# Patient Record
Sex: FEMALE | Race: WHITE | NOT HISPANIC OR LATINO | Employment: UNEMPLOYED | URBAN - METROPOLITAN AREA
[De-identification: names, ages, dates, MRNs, and addresses within clinical notes are randomized per-mention and may not be internally consistent; named-entity substitution may affect disease eponyms.]

---

## 2024-05-10 ENCOUNTER — HOSPITAL ENCOUNTER (EMERGENCY)
Facility: HOSPITAL | Age: 64
Discharge: HOME | End: 2024-05-11
Attending: EMERGENCY MEDICINE
Payer: COMMERCIAL

## 2024-05-10 DIAGNOSIS — F10.929 ALCOHOL INTOXICATION (HCC): ICD-10-CM

## 2024-05-10 DIAGNOSIS — R45.851 SUICIDAL IDEATIONS: Primary | ICD-10-CM

## 2024-05-10 PROCEDURE — 99285 EMERGENCY DEPT VISIT HI MDM: CPT

## 2024-05-11 VITALS
DIASTOLIC BLOOD PRESSURE: 93 MMHG | RESPIRATION RATE: 16 BRPM | SYSTOLIC BLOOD PRESSURE: 155 MMHG | TEMPERATURE: 97.9 F | HEART RATE: 100 BPM | OXYGEN SATURATION: 99 %

## 2024-05-11 LAB
ALBUMIN SERPL BCP-MCNC: 4.1 G/DL (ref 3.5–5)
ALP SERPL-CCNC: 67 U/L (ref 34–104)
ALT SERPL W P-5'-P-CCNC: 10 U/L (ref 7–52)
AMPHETAMINES SERPL QL SCN: NEGATIVE
ANION GAP SERPL CALCULATED.3IONS-SCNC: 8 MMOL/L (ref 4–13)
ANISOCYTOSIS BLD QL SMEAR: PRESENT
AST SERPL W P-5'-P-CCNC: 18 U/L (ref 13–39)
BARBITURATES UR QL: NEGATIVE
BASOPHILS # BLD MANUAL: 0.1 THOUSAND/UL (ref 0–0.1)
BASOPHILS NFR MAR MANUAL: 1 % (ref 0–1)
BENZODIAZ UR QL: NEGATIVE
BILIRUB SERPL-MCNC: 0.42 MG/DL (ref 0.2–1)
BILIRUB UR QL STRIP: NEGATIVE
BUN SERPL-MCNC: 21 MG/DL (ref 5–25)
CALCIUM SERPL-MCNC: 9.7 MG/DL (ref 8.4–10.2)
CHLORIDE SERPL-SCNC: 110 MMOL/L (ref 96–108)
CLARITY UR: CLEAR
CO2 SERPL-SCNC: 21 MMOL/L (ref 21–32)
COCAINE UR QL: NEGATIVE
COLOR UR: COLORLESS
CREAT SERPL-MCNC: 1.33 MG/DL (ref 0.6–1.3)
DACRYOCYTES BLD QL SMEAR: PRESENT
EOSINOPHIL # BLD MANUAL: 0.1 THOUSAND/UL (ref 0–0.4)
EOSINOPHIL NFR BLD MANUAL: 1 % (ref 0–6)
ERYTHROCYTE [DISTWIDTH] IN BLOOD BY AUTOMATED COUNT: 15.4 % (ref 11.6–15.1)
ETHANOL EXG-MCNC: 0 MG/DL
ETHANOL EXG-MCNC: 0.17 MG/DL
FENTANYL UR QL SCN: NEGATIVE
GFR SERPL CREATININE-BSD FRML MDRD: 41 ML/MIN/1.73SQ M
GLUCOSE SERPL-MCNC: 93 MG/DL (ref 65–140)
GLUCOSE UR STRIP-MCNC: NEGATIVE MG/DL
HCT VFR BLD AUTO: 40.3 % (ref 34.8–46.1)
HGB BLD-MCNC: 13.6 G/DL (ref 11.5–15.4)
HGB UR QL STRIP.AUTO: NEGATIVE
HYDROCODONE UR QL SCN: NEGATIVE
KETONES UR STRIP-MCNC: NEGATIVE MG/DL
LEUKOCYTE ESTERASE UR QL STRIP: NEGATIVE
LYMPHOCYTES # BLD AUTO: 5.85 THOUSAND/UL (ref 0.6–4.47)
LYMPHOCYTES # BLD AUTO: 60 % (ref 14–44)
MCH RBC QN AUTO: 32.3 PG (ref 26.8–34.3)
MCHC RBC AUTO-ENTMCNC: 33.7 G/DL (ref 31.4–37.4)
MCV RBC AUTO: 96 FL (ref 82–98)
METHADONE UR QL: NEGATIVE
MONOCYTES # BLD AUTO: 0.29 THOUSAND/UL (ref 0–1.22)
MONOCYTES NFR BLD: 3 % (ref 4–12)
NEUTROPHILS # BLD MANUAL: 3.26 THOUSAND/UL (ref 1.85–7.62)
NEUTS SEG NFR BLD AUTO: 34 % (ref 43–75)
NITRITE UR QL STRIP: NEGATIVE
OPIATES UR QL SCN: NEGATIVE
OXYCODONE+OXYMORPHONE UR QL SCN: NEGATIVE
PCP UR QL: NEGATIVE
PH UR STRIP.AUTO: 5.5 [PH]
PLATELET # BLD AUTO: 355 THOUSANDS/UL (ref 149–390)
PLATELET BLD QL SMEAR: ADEQUATE
PMV BLD AUTO: 9.2 FL (ref 8.9–12.7)
POIKILOCYTOSIS BLD QL SMEAR: PRESENT
POTASSIUM SERPL-SCNC: 4.2 MMOL/L (ref 3.5–5.3)
PROT SERPL-MCNC: 7.8 G/DL (ref 6.4–8.4)
PROT UR STRIP-MCNC: NEGATIVE MG/DL
RBC # BLD AUTO: 4.21 MILLION/UL (ref 3.81–5.12)
RBC MORPH BLD: PRESENT
SODIUM SERPL-SCNC: 139 MMOL/L (ref 135–147)
SP GR UR STRIP.AUTO: 1 (ref 1–1.03)
THC UR QL: NEGATIVE
TSH SERPL DL<=0.05 MIU/L-ACNC: 2.16 UIU/ML (ref 0.45–4.5)
UROBILINOGEN UR STRIP-ACNC: <2 MG/DL
VARIANT LYMPHS # BLD AUTO: 1 %
WBC # BLD AUTO: 9.59 THOUSAND/UL (ref 4.31–10.16)

## 2024-05-11 PROCEDURE — 36415 COLL VENOUS BLD VENIPUNCTURE: CPT

## 2024-05-11 PROCEDURE — 85027 COMPLETE CBC AUTOMATED: CPT

## 2024-05-11 PROCEDURE — 80053 COMPREHEN METABOLIC PANEL: CPT

## 2024-05-11 PROCEDURE — 85007 BL SMEAR W/DIFF WBC COUNT: CPT

## 2024-05-11 PROCEDURE — 84443 ASSAY THYROID STIM HORMONE: CPT

## 2024-05-11 PROCEDURE — 82075 ASSAY OF BREATH ETHANOL: CPT

## 2024-05-11 PROCEDURE — 81003 URINALYSIS AUTO W/O SCOPE: CPT

## 2024-05-11 PROCEDURE — 96374 THER/PROPH/DIAG INJ IV PUSH: CPT

## 2024-05-11 PROCEDURE — 80307 DRUG TEST PRSMV CHEM ANLYZR: CPT

## 2024-05-11 PROCEDURE — 99285 EMERGENCY DEPT VISIT HI MDM: CPT | Performed by: EMERGENCY MEDICINE

## 2024-05-11 PROCEDURE — 93005 ELECTROCARDIOGRAM TRACING: CPT

## 2024-05-11 PROCEDURE — 96376 TX/PRO/DX INJ SAME DRUG ADON: CPT

## 2024-05-11 RX ORDER — LORAZEPAM 2 MG/ML
0.5 INJECTION INTRAMUSCULAR ONCE
Status: COMPLETED | OUTPATIENT
Start: 2024-05-11 | End: 2024-05-11

## 2024-05-11 RX ADMIN — LORAZEPAM 0.5 MG: 2 INJECTION INTRAMUSCULAR; INTRAVENOUS at 03:22

## 2024-05-11 RX ADMIN — LORAZEPAM 0.5 MG: 2 INJECTION INTRAMUSCULAR; INTRAVENOUS at 01:07

## 2024-05-11 NOTE — ED PROVIDER NOTES
History  Chief Complaint   Patient presents with    Psychiatric Evaluation     Pt reports being of of psychiatric medication for 3-4 months. Pt now reports SI with a plan.     Patient is a 64-year-old female with a significant past medical history of depression, bipolar disorder, borderline personality disorder, presenting for psychiatric evaluation.  Patient reports that lately she has been feeling some increased stress and depression and worsening suicidal ideations.  She has been having thoughts to shoot herself with a gun.  She does not have access to a gun. She called a suicide hotline today while intoxicated and was subsequently brought in.  She does also endorse some occasional homicidal ideations.  She denies any drug use.  She was previously on several medications for her psychiatric conditions however has not been taking them for some time now.        None       Past Medical History:   Diagnosis Date    Depression        No past surgical history on file.    No family history on file.  I have reviewed and agree with the history as documented.    E-Cigarette/Vaping     E-Cigarette/Vaping Substances           Review of Systems   Psychiatric/Behavioral:  Positive for suicidal ideas. Negative for hallucinations and self-injury.        Physical Exam  ED Triage Vitals [05/10/24 2351]   Temperature Pulse Respirations Blood Pressure SpO2   98.2 °F (36.8 °C) 89 18 155/76 98 %      Temp Source Heart Rate Source Patient Position - Orthostatic VS BP Location FiO2 (%)   Oral Monitor Lying Left arm --      Pain Score       No Pain             Orthostatic Vital Signs  Vitals:    05/10/24 2351 05/11/24 0622 05/11/24 0749   BP: 155/76  155/93   Pulse: 89 91 100   Patient Position - Orthostatic VS: Lying  Sitting       Physical Exam  Vitals and nursing note reviewed.   Constitutional:       General: She is not in acute distress.     Appearance: Normal appearance. She is not ill-appearing or toxic-appearing.      Comments:  Intoxicated appearing. Tearful.   HENT:      Head: Normocephalic and atraumatic.      Right Ear: External ear normal.      Left Ear: External ear normal.      Nose: Nose normal.   Eyes:      General: No scleral icterus.        Right eye: No discharge.         Left eye: No discharge.      Extraocular Movements: Extraocular movements intact.      Conjunctiva/sclera: Conjunctivae normal.   Cardiovascular:      Rate and Rhythm: Normal rate.      Heart sounds: Normal heart sounds. No murmur heard.     No friction rub. No gallop.   Pulmonary:      Effort: Pulmonary effort is normal. No respiratory distress.      Breath sounds: Normal breath sounds.   Abdominal:      General: Abdomen is flat. There is no distension.      Palpations: Abdomen is soft. There is no mass.      Tenderness: There is no abdominal tenderness.   Genitourinary:     Comments: Deferred  Skin:     General: Skin is warm and dry.   Neurological:      General: No focal deficit present.      Mental Status: She is alert.   Psychiatric:         Mood and Affect: Mood is anxious.         Thought Content: Thought content includes suicidal ideation.         ED Medications  Medications   LORazepam (ATIVAN) injection 0.5 mg (0.5 mg Intravenous Given 5/11/24 0107)   LORazepam (ATIVAN) injection 0.5 mg (0.5 mg Intravenous Given 5/11/24 0322)       Diagnostic Studies  Results Reviewed       Procedure Component Value Units Date/Time    POCT alcohol breath test [[id number]]  (Normal) Resulted: 05/11/24 1022    Lab Status: Final result Updated: 05/11/24 1022     EXTBreath Alcohol 0.000    RBC Morphology Reflex Test [[id number]] Collected: 05/11/24 0103    Lab Status: Final result Specimen: Blood from Arm, Right Updated: 05/11/24 0401    CBC and differential [[id number]]  (Abnormal) Collected: 05/11/24 0103    Lab Status: Final result Specimen: Blood from Arm, Right Updated: 05/11/24 0344     WBC 9.59 Thousand/uL      RBC 4.21 Million/uL      Hemoglobin 13.6 g/dL       Hematocrit 40.3 %      MCV 96 fL      MCH 32.3 pg      MCHC 33.7 g/dL      RDW 15.4 %      MPV 9.2 fL      Platelets 355 Thousands/uL     Narrative:      This is an appended report.  These results have been appended to a previously verified report.    Manual Differential(PHLEBS Do Not Order) [[id number]]  (Abnormal) Collected: 05/11/24 0103    Lab Status: Final result Specimen: Blood from Arm, Right Updated: 05/11/24 0344     Segmented % 34 %      Lymphocytes % 60 %      Monocytes % 3 %      Eosinophils % 1 %      Basophils % 1 %      Atypical Lymphocytes % 1 %      Absolute Neutrophils 3.26 Thousand/uL      Absolute Lymphocytes 5.85 Thousand/uL      Absolute Monocytes 0.29 Thousand/uL      Absolute Eosinophils 0.10 Thousand/uL      Absolute Basophils 0.10 Thousand/uL      Total Counted --     RBC Morphology Present     Platelet Estimate Adequate     Anisocytosis Present     Poikilocytes Present     Tear Drop Cells Present    TSH [[id number]]  (Normal) Collected: 05/11/24 0103    Lab Status: Final result Specimen: Blood from Arm, Right Updated: 05/11/24 0147     TSH 3RD GENERATON 2.156 uIU/mL     Rapid drug screen, urine [[id number]]  (Normal) Collected: 05/11/24 0103    Lab Status: Final result Specimen: Urine, Clean Catch Updated: 05/11/24 0138     Amph/Meth UR Negative     Barbiturate Ur Negative     Benzodiazepine Urine Negative     Cocaine Urine Negative     Methadone Urine Negative     Opiate Urine Negative     PCP Ur Negative     THC Urine Negative     Oxycodone Urine Negative     Fentanyl Urine Negative     HYDROCODONE URINE Negative    Narrative:      FOR MEDICAL PURPOSES ONLY.   IF CONFIRMATION NEEDED PLEASE CONTACT THE LAB WITHIN 5 DAYS.    Drug Screen Cutoff Levels:  AMPHETAMINE/METHAMPHETAMINES  1000 ng/mL  BARBITURATES     200 ng/mL  BENZODIAZEPINES     200 ng/mL  COCAINE      300 ng/mL  METHADONE      300 ng/mL  OPIATES      300 ng/mL  PHENCYCLIDINE     25 ng/mL  THC       50 ng/mL  OXYCODONE      100  ng/mL  FENTANYL      5 ng/mL  HYDROCODONE     300 ng/mL    Comprehensive metabolic panel [[id number]]  (Abnormal) Collected: 05/11/24 0103    Lab Status: Final result Specimen: Blood from Arm, Right Updated: 05/11/24 0132     Sodium 139 mmol/L      Potassium 4.2 mmol/L      Chloride 110 mmol/L      CO2 21 mmol/L      ANION GAP 8 mmol/L      BUN 21 mg/dL      Creatinine 1.33 mg/dL      Glucose 93 mg/dL      Calcium 9.7 mg/dL      AST 18 U/L      ALT 10 U/L      Alkaline Phosphatase 67 U/L      Total Protein 7.8 g/dL      Albumin 4.1 g/dL      Total Bilirubin 0.42 mg/dL      eGFR 41 ml/min/1.73sq m     Narrative:      National Kid[employer] Disease Foundation guidelines for Chronic Kidney Disease (CKD):     Stage 1 with normal or high GFR (GFR > 90 mL/min/1.73 square meters)    Stage 2 Mild CKD (GFR = 60-89 mL/min/1.73 square meters)    Stage 3A Moderate CKD (GFR = 45-59 mL/min/1.73 square meters)    Stage 3B Moderate CKD (GFR = 30-44 mL/min/1.73 square meters)    Stage 4 Severe CKD (GFR = 15-29 mL/min/1.73 square meters)    Stage 5 End Stage CKD (GFR <15 mL/min/1.73 square meters)  Note: GFR calculation is accurate only with a steady state creatinine    UA w Reflex to Microscopic w Reflex to Culture [[id number]] Collected: 05/11/24 0104    Lab Status: Final result Specimen: Urine, Clean Catch Updated: 05/11/24 0118     Color, UA Colorless     Clarity, UA Clear     Specific Gravity, UA 1.004     pH, UA 5.5     Leukocytes, UA Negative     Nitrite, UA Negative     Protein, UA Negative mg/dl      Glucose, UA Negative mg/dl      Ketones, UA Negative mg/dl      Urobilinogen, UA <2.0 mg/dl      Bilirubin, UA Negative     Occult Blood, UA Negative    POCT alcohol breath test [[id number]]  (Normal) Resulted: 05/11/24 0104    Lab Status: Final result Updated: 05/11/24 0104     EXTBreath Alcohol 0.165                   No orders to display         Procedures  Procedures      ED Course                             SBIRT 22yo+       Flowsheet Row Most Recent Value   Initial Alcohol Screen: US AUDIT-C     1. How often do you have a drink containing alcohol? 0 Filed at: 05/11/2024 0052   2. How many drinks containing alcohol do you have on a typical day you are drinking?  0 Filed at: 05/11/2024 0052   3b. FEMALE Any Age, or MALE 65+: How often do you have 4 or more drinks on one occassion? 0 Filed at: 05/11/2024 0052   Audit-C Score 0 Filed at: 05/11/2024 0052   JOVANA: How many times in the past year have you...    Used an illegal drug or used a prescription medication for non-medical reasons? Never Filed at: 05/11/2024 0052                  Medical Decision Making  Patient with history as above presented for a psychiatric evaluation. History obtained from patient.    Differential diagnosis includes: alcohol intoxication, suicidal ideation    Plan: estrellita psych workup, crisis eval, monitor for sobriety    Labs reviewed and remarkable for an elevated POC alcohol. Pending reevaluation when sober. Signed out to next shift.    Amount and/or Complexity of Data Reviewed  Labs: ordered.    Risk  Prescription drug management.          Disposition  Final diagnoses:   Suicidal ideations   Alcohol intoxication (HCC)     Time reflects when diagnosis was documented in both MDM as applicable and the Disposition within this note       Time User Action Codes Description Comment    5/11/2024 11:04 AM Dominique Almonte Add [R45.851] Suicidal ideations     5/11/2024 11:04 AM Dominique Almonte Add [F10.929] Alcohol intoxication (HCC)           ED Disposition       ED Disposition   Discharge    Condition   Stable    Date/Time   Sat May 11, 2024 1104    Comment   Cheryl Godinez discharge to home/self care.                   MD Documentation      Flowsheet Row Most Recent Value   Sending MD Dr. Epstein/Dr. Almonte          Follow-up Information       Follow up With Specialties Details Why Contact Info Additional Information    [facility]  Internal Medicine   [address] [facility] [address]  [facility] [id number] [phone] [facility], [address] [place] [address], [place], [place], [address] [phone]            There are no discharge medications for this patient.    No discharge procedures on file.    PDMP Review       None             ED Provider  Attending physically available and evaluated Cheryl Corbin Herbert. I managed the patient along with the ED Attending.    Electronically Signed by           Saeid Mayes DO  05/12/24 0501
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
## 2024-05-11 NOTE — DISCHARGE INSTRUCTIONS
You were seen in the Emergency Department today for psychological evaluation.    Please follow up with your primary care doctor in 1-2 days.  Please return to the Emergency Department if you experience worsening of your current symptoms, thoughts of hurting yourself or others, or any other concerning symptoms.

## 2024-05-11 NOTE — ED NOTES
Lunch tray delivered to patient. Returned belongings to patient.     Viridiana Hidalgo RN  05/11/24 0076

## 2024-05-13 LAB
ATRIAL RATE: 105 BPM
P AXIS: 80 DEGREES
PR INTERVAL: 122 MS
QRS AXIS: -8 DEGREES
QRSD INTERVAL: 62 MS
QT INTERVAL: 314 MS
QTC INTERVAL: 415 MS
T WAVE AXIS: 97 DEGREES
VENTRICULAR RATE: 105 BPM

## 2024-05-13 PROCEDURE — 93010 ELECTROCARDIOGRAM REPORT: CPT | Performed by: INTERNAL MEDICINE

## 2024-05-13 NOTE — ED ATTENDING ATTESTATION
5/10/2024  I, Susu Hayward MD, saw and evaluated the patient. I have discussed the patient with the resident/non-physician practitioner and agree with the resident's/non-physician practitioner's findings, Plan of Care, and MDM as documented in the resident's/non-physician practitioner's note, except where noted. All available labs and Radiology studies were reviewed.  I was present for key portions of any procedure(s) performed by the resident/non-physician practitioner and I was immediately available to provide assistance.       At this point I agree with the current assessment done in the Emergency Department.  I have conducted an independent evaluation of this patient a history and physical is as follows:    OA: 63 y/o f with h/o depression, bipolar d/o who presents with worsening depression and SI. Pt states that she feels hopeless, admits to drinking alcohol today and states she has thoughts of SI using a gun. PT does not currently have a gun per history. Currently denies thoughts of harm to others. Denies drug use. Denies compliance with psych meds, unclear if she is taking her BP medications. Denies cp/sob/headache/n/v/abd or back pain/urinary sxms/n/v/d or recent trauma. PE, cooperative, appears intoxicated, VSS, NC/AT, MMM, neck supple. FROM, RR, lungs CTAB, abd soft, +Bs, no obvious marks of current self harm, well healed scar on wrist, intact pulses, SI. A/p SI with plan. Geriatric psych. Will require re-evaluation when sober. Plan for 201 v 302.     ED Course     Pt signed out with crisis evaluation and 201 v 302 pending.     Critical Care Time  Procedures       1.64

## 2024-07-09 ENCOUNTER — HOSPITAL ENCOUNTER (EMERGENCY)
Facility: HOSPITAL | Age: 64
Discharge: HOME | End: 2024-07-09
Attending: EMERGENCY MEDICINE
Payer: COMMERCIAL

## 2024-07-09 ENCOUNTER — APPOINTMENT (EMERGENCY)
Dept: RADIOLOGY | Facility: HOSPITAL | Age: 64
End: 2024-07-09
Payer: COMMERCIAL

## 2024-07-09 VITALS
RESPIRATION RATE: 20 BRPM | OXYGEN SATURATION: 97 % | TEMPERATURE: 98 F | SYSTOLIC BLOOD PRESSURE: 95 MMHG | DIASTOLIC BLOOD PRESSURE: 58 MMHG | HEART RATE: 84 BPM

## 2024-07-09 DIAGNOSIS — S99.929A FOOT INJURY: Primary | ICD-10-CM

## 2024-07-09 PROCEDURE — 99283 EMERGENCY DEPT VISIT LOW MDM: CPT

## 2024-07-09 PROCEDURE — 73630 X-RAY EXAM OF FOOT: CPT

## 2024-07-09 PROCEDURE — 10140 I&D HMTMA SEROMA/FLUID COLLJ: CPT | Performed by: EMERGENCY MEDICINE

## 2024-07-09 PROCEDURE — 90471 IMMUNIZATION ADMIN: CPT

## 2024-07-09 PROCEDURE — 90715 TDAP VACCINE 7 YRS/> IM: CPT

## 2024-07-09 PROCEDURE — 99284 EMERGENCY DEPT VISIT MOD MDM: CPT | Performed by: EMERGENCY MEDICINE

## 2024-07-09 RX ORDER — CEFADROXIL 500 MG/1
500 CAPSULE ORAL EVERY 12 HOURS SCHEDULED
Qty: 14 CAPSULE | Refills: 0 | Status: SHIPPED | OUTPATIENT
Start: 2024-07-09 | End: 2024-07-16

## 2024-07-09 RX ORDER — LIDOCAINE HYDROCHLORIDE 10 MG/ML
10 INJECTION, SOLUTION EPIDURAL; INFILTRATION; INTRACAUDAL; PERINEURAL ONCE
Status: COMPLETED | OUTPATIENT
Start: 2024-07-09 | End: 2024-07-09

## 2024-07-09 RX ORDER — ACETAMINOPHEN 325 MG/1
650 TABLET ORAL ONCE
Status: COMPLETED | OUTPATIENT
Start: 2024-07-09 | End: 2024-07-09

## 2024-07-09 RX ORDER — CEFADROXIL 500 MG/1
1000 CAPSULE ORAL ONCE
Status: COMPLETED | OUTPATIENT
Start: 2024-07-09 | End: 2024-07-09

## 2024-07-09 RX ADMIN — TETANUS TOXOID, REDUCED DIPHTHERIA TOXOID AND ACELLULAR PERTUSSIS VACCINE, ADSORBED 0.5 ML: 5; 2.5; 8; 8; 2.5 SUSPENSION INTRAMUSCULAR at 19:04

## 2024-07-09 RX ADMIN — CEFADROXIL 1000 MG: 500 CAPSULE ORAL at 20:51

## 2024-07-09 RX ADMIN — LIDOCAINE HYDROCHLORIDE 10 ML: 10 INJECTION, SOLUTION EPIDURAL; INFILTRATION; INTRACAUDAL; PERINEURAL at 18:59

## 2024-07-09 RX ADMIN — ACETAMINOPHEN 650 MG: 325 TABLET, FILM COATED ORAL at 19:04

## 2024-07-09 NOTE — ED PROVIDER NOTES
History  Chief Complaint   Patient presents with    Foot Swelling     Pt was pulling out a dresser drawer 3 days ago and has L foot swelling, redness, and bruising     HPI    Patient is a 64 year old female w/ hx of HTN who presents with left foot pain after she fell, pulling a dresser drawer onto her foot 3 days ago. She was able to ambulate. The pain has worsened. She has recently been started on her carvediol again and has an increase in falls. She has an appointment this week with her PCP to adjust her dose.  She denies headache, weakness, numbness.         None       Past Medical History:   Diagnosis Date    Depression        History reviewed. No pertinent surgical history.    History reviewed. No pertinent family history.  I have reviewed and agree with the history as documented.    E-Cigarette/Vaping     E-Cigarette/Vaping Substances           Review of Systems   Constitutional:  Negative for chills and fever.   HENT:  Negative for congestion and sore throat.    Respiratory:  Negative for cough and shortness of breath.    Cardiovascular:  Negative for chest pain and palpitations.   Gastrointestinal:  Negative for abdominal pain and vomiting.   Genitourinary:  Negative for dysuria and hematuria.   Musculoskeletal:  Negative for back pain and neck pain.   Neurological:  Negative for syncope and headaches.   All other systems reviewed and are negative.      Physical Exam  ED Triage Vitals [07/09/24 1628]   Temperature Pulse Respirations Blood Pressure SpO2   98 °F (36.7 °C) 84 20 95/58 97 %      Temp Source Heart Rate Source Patient Position - Orthostatic VS BP Location FiO2 (%)   Oral Monitor -- Right arm --      Pain Score       5             Orthostatic Vital Signs  Vitals:    07/09/24 1628   BP: 95/58   Pulse: 84       Physical Exam  Vitals and nursing note reviewed.   Constitutional:       General: She is not in acute distress.     Appearance: She is well-developed.   HENT:      Head: Normocephalic and  atraumatic.   Eyes:      Conjunctiva/sclera: Conjunctivae normal.   Cardiovascular:      Rate and Rhythm: Normal rate and regular rhythm.      Heart sounds: No murmur heard.  Pulmonary:      Effort: Pulmonary effort is normal. No respiratory distress.      Breath sounds: Normal breath sounds.   Abdominal:      Palpations: Abdomen is soft.      Tenderness: There is no abdominal tenderness.   Musculoskeletal:      Cervical back: Neck supple.      Comments: Tender to palpation, edematous. Left foot with large area of ecchymosis with a a small wound over lateral foot edge. No active bleeding.    Skin:     General: Skin is warm and dry.      Capillary Refill: Capillary refill takes less than 2 seconds.   Neurological:      Mental Status: She is alert.      Comments: Decreased sensation left foot.   Psychiatric:         Mood and Affect: Mood normal.         ED Medications  Medications   acetaminophen (TYLENOL) tablet 650 mg (650 mg Oral Given 7/9/24 1904)   tetanus-diphtheria-acellular pertussis (BOOSTRIX) IM injection 0.5 mL (0.5 mL Intramuscular Given 7/9/24 1904)   lidocaine (PF) (XYLOCAINE-MPF) 1 % injection 10 mL (10 mL Infiltration Given 7/9/24 1859)   cefadroxil (DURICEF) capsule 1,000 mg (1,000 mg Oral Given 7/9/24 2051)       Diagnostic Studies  Results Reviewed       None                   XR foot 3+ views LEFT   Final Result by Ga Geronimo MD (07/10 1017)      No acute osseous abnormality.         Computerized Assisted Algorithm (CAA) may have been used to analyze all applicable images.         Workstation performed: [id number]               Procedures  Incision and drain    Date/Time: 7/9/2024 6:00 PM    Performed by: Dominique Almonte MD  Authorized by: Dominique Almonte MD  Universal Protocol:  Consent: Verbal consent obtained.  Risks and benefits: risks, benefits and alternatives were discussed  Consent given by: patient  Patient understanding: patient states understanding of the procedure being  performed  Patient identity confirmed: hospital-assigned identification number    Patient location:  ED  Location:     Type:  Fluid collection    Location:  Lower extremity    Lower extremity location:  L foot  Pre-procedure details:     Skin preparation:  Chloraprep  Anesthesia (see MAR for exact dosages):     Anesthesia method:  Local infiltration    Local anesthetic:  Lidocaine 1% w/o epi  Procedure details:     Complexity:  Simple    Incision types:  Stab incision    Scalpel blade:  11    Approach:  Open    Incision depth:  Subcutaneous    Wound management:  Irrigated with saline    Drainage:  Bloody    Drainage amount:  Moderate    Wound treatment:  Wound left open  Post-procedure details:     Patient tolerance of procedure:  Tolerated well, no immediate complications        ED Course                                       Medical Decision Making  Differential includes fracture, dislocation, soft tissue injury.  Will order x-ray and reevaluate.    X-rays are negative.  Will I&D to allow fluid to drain.    A moderate amount of fluid was drained from patient's foot. She was able to ambulate on her own. An ambulatory referral for podiatry was provided with her.  He was told to follow-up with them.  She was given return precautions and discharged from the ED     Amount and/or Complexity of Data Reviewed  Radiology: ordered.    Risk  OTC drugs.  Prescription drug management.          Disposition  Final diagnoses:   Foot injury     Time reflects when diagnosis was documented in both MDM as applicable and the Disposition within this note       Time User Action Codes Description Comment    7/9/2024  8:21 PM Dominique Almonte Add [S99.929A] Foot injury           ED Disposition       ED Disposition   Discharge    Condition   Stable    Date/Time   Tue Jul 9, 2024  8:21 PM    Comment   Cheryl Godinez discharge to home/self care.                   Follow-up Information       Follow up With Specialties Details Why Contact  Info Additional Information    [facility] [place] [facility]   [address] [facility] [address] [id number] [phone] [facility] [place], [address], [place], [id number] [phone]            Discharge Medication List as of 7/9/2024  8:25 PM        START taking these medications    Details   cefadroxil (DURICEF) 500 mg capsule Take 1 capsule (500 mg total) by mouth every 12 (twelve) hours for 7 days, Starting Tue 7/9/2024, Until Tue 7/16/2024, Normal               PDMP Review       None             ED Provider  Attending physically available and evaluated Cheryl Godinez. I managed the patient along with the ED Attending.    Electronically Signed by           Dominique Almonte MD  07/12/24 9876

## 2024-07-09 NOTE — ED ATTENDING ATTESTATION
7/9/2024  I, Ced Renteria DO, saw and evaluated the patient. I have discussed the patient with the resident/non-physician practitioner and agree with the resident's/non-physician practitioner's findings, Plan of Care, and MDM as documented in the resident's/non-physician practitioner's note, except where noted. All available labs and Radiology studies were reviewed.  I was present for key portions of any procedure(s) performed by the resident/non-physician practitioner and I was immediately available to provide assistance.       At this point I agree with the current assessment done in the Emergency Department.  I have conducted an independent evaluation of this patient a history and physical is as follows:    65 yo woman presents for L foot pain and swelling after a dresser drawer fell out onto her foot. Able to ambulate with some pain. Has large area of bruising. No focal weakness, fever. Pt on carvedilol - had been on it remotely then stopped taking it. Started taking it again recently and has increased frequency of falls. PMD cut carvedilol in half.     BP here 95/58.     Imp: L foot injury. Likely contusion, consider fx, infx plan: L foot xray, reassess.      ED Course         Critical Care Time  Procedures

## 2024-07-10 NOTE — DISCHARGE INSTRUCTIONS
You were seen in the Emergency Department today for a foot injury.    Please follow up with Podiatry.  Please return to the Emergency Department if you experience worsening of your current symptoms, severe pain, increased redness or swelling, or any other concerning symptoms.

## 2024-07-24 ENCOUNTER — HOSPITAL ENCOUNTER (INPATIENT)
Facility: HOSPITAL | Age: 64
LOS: 2 days | Discharge: HOME | DRG: 605 | End: 2024-07-26
Attending: EMERGENCY MEDICINE | Admitting: PODIATRIST
Payer: COMMERCIAL

## 2024-07-24 ENCOUNTER — APPOINTMENT (EMERGENCY)
Dept: RADIOLOGY | Facility: HOSPITAL | Age: 64
DRG: 605 | End: 2024-07-24
Payer: COMMERCIAL

## 2024-07-24 ENCOUNTER — APPOINTMENT (INPATIENT)
Dept: NON INVASIVE DIAGNOSTICS | Facility: HOSPITAL | Age: 64
DRG: 605 | End: 2024-07-24
Payer: COMMERCIAL

## 2024-07-24 DIAGNOSIS — T14.8XXA INFECTED WOUND: Primary | ICD-10-CM

## 2024-07-24 DIAGNOSIS — S90.32XA HEMATOMA OF LEFT FOOT: ICD-10-CM

## 2024-07-24 DIAGNOSIS — S99.922D FOOT INJURY, LEFT, SUBSEQUENT ENCOUNTER: ICD-10-CM

## 2024-07-24 DIAGNOSIS — L08.9 INFECTED WOUND: Primary | ICD-10-CM

## 2024-07-24 PROBLEM — S89.90XA LEG INJURY: Status: ACTIVE | Noted: 2024-07-24

## 2024-07-24 PROBLEM — L03.116 CELLULITIS OF LEFT FOOT: Status: ACTIVE | Noted: 2024-07-24

## 2024-07-24 PROBLEM — I10 HYPERTENSION: Status: ACTIVE | Noted: 2024-07-24

## 2024-07-24 PROBLEM — M79.672 LEFT FOOT PAIN: Status: ACTIVE | Noted: 2024-07-24

## 2024-07-24 PROBLEM — I21.9 HEART ATTACK (HCC): Status: ACTIVE | Noted: 2024-07-24

## 2024-07-24 LAB
ANION GAP SERPL CALCULATED.3IONS-SCNC: 10 MMOL/L (ref 4–13)
BASOPHILS # BLD AUTO: 0.08 THOUSANDS/ÂΜL (ref 0–0.1)
BASOPHILS NFR BLD AUTO: 1 % (ref 0–1)
BUN SERPL-MCNC: 26 MG/DL (ref 5–25)
CALCIUM SERPL-MCNC: 9.2 MG/DL (ref 8.4–10.2)
CHLORIDE SERPL-SCNC: 105 MMOL/L (ref 96–108)
CO2 SERPL-SCNC: 25 MMOL/L (ref 21–32)
CREAT SERPL-MCNC: 1.62 MG/DL (ref 0.6–1.3)
CRP SERPL QL: 11.2 MG/L
EOSINOPHIL # BLD AUTO: 0.05 THOUSAND/ÂΜL (ref 0–0.61)
EOSINOPHIL NFR BLD AUTO: 1 % (ref 0–6)
ERYTHROCYTE [DISTWIDTH] IN BLOOD BY AUTOMATED COUNT: 15.5 % (ref 11.6–15.1)
ERYTHROCYTE [SEDIMENTATION RATE] IN BLOOD: 44 MM/HOUR (ref 0–29)
GFR SERPL CREATININE-BSD FRML MDRD: 33 ML/MIN/1.73SQ M
GLUCOSE SERPL-MCNC: 142 MG/DL (ref 65–140)
HCT VFR BLD AUTO: 37.2 % (ref 34.8–46.1)
HGB BLD-MCNC: 12.4 G/DL (ref 11.5–15.4)
IMM GRANULOCYTES # BLD AUTO: 0.02 THOUSAND/UL (ref 0–0.2)
IMM GRANULOCYTES NFR BLD AUTO: 0 % (ref 0–2)
LYMPHOCYTES # BLD AUTO: 2.91 THOUSANDS/ÂΜL (ref 0.6–4.47)
LYMPHOCYTES NFR BLD AUTO: 37 % (ref 14–44)
MCH RBC QN AUTO: 31.7 PG (ref 26.8–34.3)
MCHC RBC AUTO-ENTMCNC: 33.3 G/DL (ref 31.4–37.4)
MCV RBC AUTO: 95 FL (ref 82–98)
MONOCYTES # BLD AUTO: 0.61 THOUSAND/ÂΜL (ref 0.17–1.22)
MONOCYTES NFR BLD AUTO: 8 % (ref 4–12)
NEUTROPHILS # BLD AUTO: 4.21 THOUSANDS/ÂΜL (ref 1.85–7.62)
NEUTS SEG NFR BLD AUTO: 53 % (ref 43–75)
NRBC BLD AUTO-RTO: 0 /100 WBCS
PLATELET # BLD AUTO: 340 THOUSANDS/UL (ref 149–390)
PMV BLD AUTO: 8.8 FL (ref 8.9–12.7)
POTASSIUM SERPL-SCNC: 3.8 MMOL/L (ref 3.5–5.3)
RBC # BLD AUTO: 3.91 MILLION/UL (ref 3.81–5.12)
SODIUM SERPL-SCNC: 140 MMOL/L (ref 135–147)
WBC # BLD AUTO: 7.88 THOUSAND/UL (ref 4.31–10.16)

## 2024-07-24 PROCEDURE — 96361 HYDRATE IV INFUSION ADD-ON: CPT

## 2024-07-24 PROCEDURE — 99223 1ST HOSP IP/OBS HIGH 75: CPT | Performed by: PODIATRIST

## 2024-07-24 PROCEDURE — 99284 EMERGENCY DEPT VISIT MOD MDM: CPT

## 2024-07-24 PROCEDURE — 73701 CT LOWER EXTREMITY W/DYE: CPT

## 2024-07-24 PROCEDURE — 96374 THER/PROPH/DIAG INJ IV PUSH: CPT

## 2024-07-24 PROCEDURE — 86140 C-REACTIVE PROTEIN: CPT

## 2024-07-24 PROCEDURE — 80048 BASIC METABOLIC PNL TOTAL CA: CPT

## 2024-07-24 PROCEDURE — 93971 EXTREMITY STUDY: CPT | Performed by: SURGERY

## 2024-07-24 PROCEDURE — 85025 COMPLETE CBC W/AUTO DIFF WBC: CPT

## 2024-07-24 PROCEDURE — 99285 EMERGENCY DEPT VISIT HI MDM: CPT | Performed by: EMERGENCY MEDICINE

## 2024-07-24 PROCEDURE — 93971 EXTREMITY STUDY: CPT

## 2024-07-24 PROCEDURE — 1124F ACP DISCUSS-NO DSCNMKR DOCD: CPT | Performed by: EMERGENCY MEDICINE

## 2024-07-24 PROCEDURE — 36415 COLL VENOUS BLD VENIPUNCTURE: CPT

## 2024-07-24 PROCEDURE — 85652 RBC SED RATE AUTOMATED: CPT

## 2024-07-24 RX ORDER — HYDROCODONE BITARTRATE AND ACETAMINOPHEN 5; 325 MG/1; MG/1
1 TABLET ORAL EVERY 6 HOURS PRN
Status: DISCONTINUED | OUTPATIENT
Start: 2024-07-24 | End: 2024-07-26 | Stop reason: HOSPADM

## 2024-07-24 RX ORDER — ACETAMINOPHEN 325 MG/1
650 TABLET ORAL EVERY 6 HOURS PRN
Status: DISCONTINUED | OUTPATIENT
Start: 2024-07-24 | End: 2024-07-26 | Stop reason: HOSPADM

## 2024-07-24 RX ORDER — HYDROMORPHONE HCL/PF 1 MG/ML
0.5 SYRINGE (ML) INJECTION ONCE
Status: COMPLETED | OUTPATIENT
Start: 2024-07-24 | End: 2024-07-24

## 2024-07-24 RX ORDER — HYDROMORPHONE HCL IN WATER/PF 6 MG/30 ML
0.2 PATIENT CONTROLLED ANALGESIA SYRINGE INTRAVENOUS EVERY 6 HOURS PRN
Status: DISCONTINUED | OUTPATIENT
Start: 2024-07-24 | End: 2024-07-26 | Stop reason: HOSPADM

## 2024-07-24 RX ORDER — CEFAZOLIN SODIUM 2 G/50ML
2000 SOLUTION INTRAVENOUS EVERY 8 HOURS
Status: DISCONTINUED | OUTPATIENT
Start: 2024-07-24 | End: 2024-07-26 | Stop reason: HOSPADM

## 2024-07-24 RX ORDER — CALCIUM CARBONATE 500 MG/1
1000 TABLET, CHEWABLE ORAL DAILY PRN
Status: DISCONTINUED | OUTPATIENT
Start: 2024-07-24 | End: 2024-07-26 | Stop reason: HOSPADM

## 2024-07-24 RX ORDER — ONDANSETRON 2 MG/ML
4 INJECTION INTRAMUSCULAR; INTRAVENOUS EVERY 6 HOURS PRN
Status: DISCONTINUED | OUTPATIENT
Start: 2024-07-24 | End: 2024-07-26 | Stop reason: HOSPADM

## 2024-07-24 RX ORDER — DOCUSATE SODIUM 100 MG/1
100 CAPSULE, LIQUID FILLED ORAL 2 TIMES DAILY
Status: DISCONTINUED | OUTPATIENT
Start: 2024-07-24 | End: 2024-07-26 | Stop reason: HOSPADM

## 2024-07-24 RX ORDER — OXYCODONE HYDROCHLORIDE AND ACETAMINOPHEN 5; 325 MG/1; MG/1
1 TABLET ORAL EVERY 6 HOURS PRN
Status: DISCONTINUED | OUTPATIENT
Start: 2024-07-24 | End: 2024-07-26 | Stop reason: HOSPADM

## 2024-07-24 RX ADMIN — IOHEXOL 100 ML: 350 INJECTION, SOLUTION INTRAVENOUS at 15:01

## 2024-07-24 RX ADMIN — SODIUM CHLORIDE 1000 ML: 0.9 INJECTION, SOLUTION INTRAVENOUS at 14:45

## 2024-07-24 RX ADMIN — HYDROMORPHONE HYDROCHLORIDE 0.5 MG: 1 INJECTION, SOLUTION INTRAMUSCULAR; INTRAVENOUS; SUBCUTANEOUS at 18:17

## 2024-07-24 RX ADMIN — OXYCODONE HYDROCHLORIDE AND ACETAMINOPHEN 1 TABLET: 5; 325 TABLET ORAL at 21:23

## 2024-07-24 RX ADMIN — HYDROMORPHONE HYDROCHLORIDE 0.5 MG: 1 INJECTION, SOLUTION INTRAMUSCULAR; INTRAVENOUS; SUBCUTANEOUS at 14:45

## 2024-07-24 RX ADMIN — CEFAZOLIN SODIUM 2000 MG: 2 SOLUTION INTRAVENOUS at 18:50

## 2024-07-24 NOTE — ED NOTES
Patient in waiting room with no outward signs of distress. Respiratory effort is even and unlabored      Carmen Low RN  07/24/24 1450

## 2024-07-24 NOTE — CONSULTS
Podiatry - Consultation    Patient Information:   Cheryl Godinez 64 y.o. female MRN: [id number]  Unit/Bed#: ED 21 Encounter: [id number]  PCP: No primary care provider on file.  Date of Admission:  7/24/2024  Date of Consultation: 07/24/24  Requesting Physician: Viridiana Storey,       ASSESSMENT:    Cheryl Godinez is a 64 y.o. female with:    Post-traumatic hematoma of the left foot  Cellulitis  Left foot pain  Hypertension    PLAN:    Patient will be admitted under Podiatry service for cellulitic left foot post-traumatic hematoma under Dr. Bills.   Patient is neurovascularly intact. Mild cellulitis noted to the left foot. Severe pain handled relatively well by Dilaudid. Pain prescriptions in for mild, moderate, severe, and breakthrough pain as needed.  Plan to admit to podiatry service for monitoring with IV abx for cellulitis and rehabilitation as patient relays that at home she is unable to stay off of her foot.  CT reviewed, swelling to left forefoot, no gas emphysema noted. No fractures  Labs and vitals reviewed, patient is afebrile and aleukocytic with an elevated sed rate and CRP  Start on Ancef 2gq8  Daily draw labs ordered (CBC and BMP)  RICE protocol with ice behind left knee  F/u Venous duplex to r/o DVT to left leg  WBAT to left heel in surgical shoe  Appreciate SLIM consult as while patient states she has limited prior medical history, on repeated questioning she will   F/u CM consult. Patient relays she is unable to stay off of her foot as she is her own primary caretaker and has no assistance at home.  PT/OT consulted for change in weightbearing status will follow-up suggestions.  Local wound care consisting of Betadine DSD with ACE compression. Patient insisted that while she is allergic to shellfish, she has had betadine topically before and no issues with it.  Elevation on green foam wedges or pillows when non-ambulatory  Will discuss this plan with my attending and update as  needed.    Weightbearing status: WBAT to left heel in surgical shoe    SUBJECTIVE:    History of Present Illness:    Cheryl Godinez is a 64 y.o. female who is originally admitted 7/24/2024 due to pain in the left foot secondary to trauma to the foot. Patient has a past medical history of Hypertension, smoking history 25 years ago, rheumatoid arthritis, and past heart attack. Patient reports that she dropped a drawer on her left foot from a height of 4 feet. She previously came to the ED on 07/09/24 for this injury and an I&D was done in the emergency department. Xrays were negative for fractures and fluid was drained from her foot before being discharged. She mentions that the fluid from the I&D was clear and bloody. She was at her primary doctor's office today who advised her to come to the ED to get her foot checked again. Her pain level is 10/10. She has tried Motrin for pain. Patient also mentions that the swelling and ecchymosis has been spreading in her lower extremity as she has been unable to stay off of her foot. She walked into the ED today. Patient relays that she has no support at home and she currently live in a room of a house a couple rents out to her. She is unable to stay off of her foot as she is her own primary caretaker. No fevers, chills, nausea, SOB currently or recently. She mentions left foot pins and needles.    We are consulted for post-traumatic left foot pain with hematoma formation    Review of Systems:    Constitutional: Negative.    HENT: Negative.    Eyes: Negative.    Respiratory: Negative.    Cardiovascular: Negative.    Gastrointestinal: Negative.    Musculoskeletal: Left foot pain   Skin:Left foot hematoma, cellulitis  Neurological: Pins of needles of left foot  Psych: Negative.     Past Medical and Surgical History:     Past Medical History:   Diagnosis Date    Depression        No past surgical history on file.    Meds/Allergies:    Not in a hospital  admission.    Allergies   Allergen Reactions    Methotrexate GI Intolerance    Sacubitril-Valsartan GI Intolerance    Iodine - Food Allergy Rash       Social History:     Marital Status: Single    Substance Use History:   Social History     Substance and Sexual Activity   Alcohol Use None     Social History     Tobacco Use   Smoking Status Not on file   Smokeless Tobacco Not on file     Social History     Substance and Sexual Activity   Drug Use Not on file       Family History:    No family history on file.      OBJECTIVE:    Vitals:   Blood Pressure: 167/75 (07/24/24 1549)  Pulse: 75 (07/24/24 1549)  Temperature: 98.1 °F (36.7 °C) (07/24/24 1120)  Temp Source: Oral (07/24/24 1120)  Respirations: 16 (07/24/24 1549)  SpO2: 100 % (07/24/24 1549)    Physical Exam:    General Appearance: Alert, cooperative, no distress.  HEENT: Head normocephalic, atraumatic, without obvious abnormality.  Heart: Normal rate and rhythm.  Lungs: Non-labored breathing. No respiratory distress.  Abdomen: Without distension.  Psychiatric: AAOx3  Lower Extremity:  Vascular:   Right DP and PT pulses are present. Left DP and PT pulses are present. CRT < 3 seconds at the digits. +3/4 edema noted at left lower extremities. Pedal hair is present. Skin temperature is warm bilaterally.    Musculoskeletal:  Pain on palpation to the left foot at the level of the metatarsals on the dorsal forefoot  Reduced ROM of left toes and ankle  Patient able to move toes of left foot    5/5 MMT to right foot, Ankle ROM WNL  No pain on palpation    Dermatological:  Lower extremity wound(s) as noted below:    Closed Hematoma noted to left forefoot dorsally in the 4th/5th interspace with pain on palpation and mild cellulitis  Entire left forefoot and midfoot are edematous and ecchymotic  Middle of hematoma is an eschar, no bogginess noted. No odor, crepitus, fluctuance.      Neurological:  Gross sensation is intact. Protective sensation is intact.    Clinical Images  "07/24/24:      Additional data:     Lab Results: I have personally reviewed pertinent labs including:    Results from last 7 days   Lab Units 07/24/24  1355   WBC Thousand/uL 7.88   HEMOGLOBIN g/dL 12.4   HEMATOCRIT % 37.2   PLATELETS Thousands/uL 340   SEGS PCT % 53   LYMPHO PCT % 37   MONO PCT % 8   EOS PCT % 1     Results from last 7 days   Lab Units 07/24/24  1355   POTASSIUM mmol/L 3.8   CHLORIDE mmol/L 105   CO2 mmol/L 25   BUN mg/dL 26*   CREATININE mg/dL 1.62*   CALCIUM mg/dL 9.2           Cultures: I have personally reviewed pertinent cultures including:                  ** Please Note: Portions of the record may have been created with voice recognition software. Occasional wrong word or \"sound a like\" substitutions may have occurred due to the inherent limitations of voice recognition software. Read the chart carefully and recognize, using context, where substitutions have occurred. **    "

## 2024-07-24 NOTE — ED NOTES
Ice applied to LLE and extremity elevated on pillow at this time     Rashmi Jane, JOANN  07/24/24 3836

## 2024-07-24 NOTE — ED ATTENDING ATTESTATION
7/24/2024  I, Viridiana Storey DO, saw and evaluated the patient. I have discussed the patient with the resident/non-physician practitioner and agree with the resident's/non-physician practitioner's findings, Plan of Care, and MDM as documented in the resident's/non-physician practitioner's note, except where noted. All available labs and Radiology studies were reviewed.  I was present for key portions of any procedure(s) performed by the resident/non-physician practitioner and I was immediately available to provide assistance.       At this point I agree with the current assessment done in the Emergency Department.  I have conducted an independent evaluation of this patient a history and physical is as follows:    Chief Complaint   Patient presents with    Leg Injury     Patient was seen on the 19th for a left foot injury. She arrives today because her PCP told her the foot is now infected. Foot is red and bruised. Would is bleeding, skin is peeling.,      64-year-old female presents with left foot pain.  Patient states she dropped a drawer on it on July 9 and was seen on the ninth for infection that was drained at that time.  Patient went to see family doctor today and was sent to the emergency department secondary to increased swelling, redness and pain.  Patient states she did take antibiotics after last visit.  Reports that the redness has gotten worse over the past couple days.  On exam-no acute distress, heart regular, no respiratory distress, left foot with erythema and warm to touch, small area of peeling skin, ecchymosis noted on the dorsum of the foot and on toes, necrotic area at base of fifth toe.  Edema of foot.  Plan-concern for infection versus other pathology in the left foot, will do CT, check labs.  Likely consult podiatry and reassess    ED Course         Critical Care Time  Procedures

## 2024-07-24 NOTE — ED PROVIDER NOTES
History  Chief Complaint   Patient presents with    Leg Injury     Patient was seen on the 19th for a left foot injury. She arrives today because her PCP told her the foot is now infected. Foot is red and bruised. Would is bleeding, skin is peeling.,      HPI    Patient is a 64-year-old female with with a past medical history of left dorsal foot wound secondary to trauma presenting today for increasing redness, swelling, skin peeling, and pain in her left foot.  On July 9, she passed out and dropped a drawer on her foot causing a wound.  She was seen on this day, received an incision and drainage, and sent home with antibiotics.  Patient states she finished the course of antibiotics.  She states has an appointment to see podiatry on the 29th.  She has been using spray and peroxide on her foot with no improvement.  She denies fevers, chills, nausea, vomiting, constipation, diarrhea, and pain other than her left foot.    None       Past Medical History:   Diagnosis Date    Depression        No past surgical history on file.    No family history on file.  I have reviewed and agree with the history as documented.    E-Cigarette/Vaping     E-Cigarette/Vaping Substances           Review of Systems   Skin:  Positive for color change and wound.        Increasing pain, redness, skin peeling, and pain to left foot wound.   All other systems reviewed and are negative.      Physical Exam  ED Triage Vitals   Temperature Pulse Respirations Blood Pressure SpO2   07/24/24 1120 07/24/24 1120 07/24/24 1120 07/24/24 1120 07/24/24 1120   98.1 °F (36.7 °C) 88 16 (!) 183/94 100 %      Temp Source Heart Rate Source Patient Position - Orthostatic VS BP Location FiO2 (%)   07/24/24 1120 07/24/24 1120 07/24/24 1120 07/24/24 1120 --   Oral Monitor Sitting Left arm       Pain Score       07/24/24 1445       10 - Worst Possible Pain             Orthostatic Vital Signs  Vitals:    07/24/24 1120 07/24/24 1549 07/24/24 1700   BP: (!) 183/94 167/75  169/82   Pulse: 88 75 80   Patient Position - Orthostatic VS: Sitting         Physical Exam  Vitals and nursing note reviewed.   Constitutional:       General: She is not in acute distress.     Appearance: Normal appearance. She is not ill-appearing, toxic-appearing or diaphoretic.   HENT:      Head: Normocephalic and atraumatic.   Eyes:      General: No scleral icterus.        Right eye: No discharge.         Left eye: No discharge.   Cardiovascular:      Rate and Rhythm: Normal rate and regular rhythm.      Pulses: Normal pulses.           Dorsalis pedis pulses are 2+ on the right side and 2+ on the left side.      Heart sounds: Normal heart sounds. No murmur heard.     No friction rub. No gallop.   Pulmonary:      Effort: Pulmonary effort is normal. No respiratory distress.      Breath sounds: Normal breath sounds. No stridor. No wheezing, rhonchi or rales.   Abdominal:      General: Bowel sounds are normal. There is no distension.      Palpations: Abdomen is soft.      Tenderness: There is no abdominal tenderness. There is no right CVA tenderness, left CVA tenderness, guarding or rebound.   Musculoskeletal:         General: Normal range of motion.      Right lower leg: Edema present.      Left lower leg: Edema present.   Skin:     General: Skin is warm and dry.      Coloration: Skin is not jaundiced.      Findings: Wound (On dorsum of left foot.  Collection of dried red blood towards the fifth metatarsal.  Peeling of the skin and bruising noted on dorsal left foot that extends up to ankle.  See chart for photograph.) present.   Neurological:      General: No focal deficit present.      Mental Status: She is alert and oriented to person, place, and time.      Cranial Nerves: No cranial nerve deficit.      Sensory: No sensory deficit.      Motor: No weakness.   Psychiatric:         Mood and Affect: Mood normal.         Behavior: Behavior normal.         Thought Content: Thought content normal.         Judgment:  Judgment normal.         ED Medications  Medications   HYDROmorphone (DILAUDID) injection 0.5 mg (has no administration in time range)   HYDROmorphone (DILAUDID) injection 0.5 mg (0.5 mg Intravenous Given 7/24/24 1445)   sodium chloride 0.9 % bolus 1,000 mL (1,000 mL Intravenous New Bag 7/24/24 1445)   iohexol (OMNIPAQUE) 350 MG/ML injection (SINGLE-DOSE) 100 mL (100 mL Intravenous Given 7/24/24 1501)       Diagnostic Studies  Results Reviewed       Procedure Component Value Units Date/Time    C-reactive protein [[id number]]  (Abnormal) Collected: 07/24/24 1355    Lab Status: Final result Specimen: Blood from Arm, Left Updated: 07/24/24 1717     CRP 11.2 mg/L     Sedimentation rate, automated [[id number]]  (Abnormal) Collected: 07/24/24 1355    Lab Status: Final result Specimen: Blood from Arm, Left Updated: 07/24/24 1659     Sed Rate 44 mm/hour     Basic metabolic panel [[id number]]  (Abnormal) Collected: 07/24/24 1355    Lab Status: Final result Specimen: Blood from Arm, Left Updated: 07/24/24 1423     Sodium 140 mmol/L      Potassium 3.8 mmol/L      Chloride 105 mmol/L      CO2 25 mmol/L      ANION GAP 10 mmol/L      BUN 26 mg/dL      Creatinine 1.62 mg/dL      Glucose 142 mg/dL      Calcium 9.2 mg/dL      eGFR 33 ml/min/1.73sq m     Narrative:      [employer] guidelines for Chronic Kidney Disease (CKD):     Stage 1 with normal or high GFR (GFR > 90 mL/min/1.73 square meters)    Stage 2 Mild CKD (GFR = 60-89 mL/min/1.73 square meters)    Stage 3A Moderate CKD (GFR = 45-59 mL/min/1.73 square meters)    Stage 3B Moderate CKD (GFR = 30-44 mL/min/1.73 square meters)    Stage 4 Severe CKD (GFR = 15-29 mL/min/1.73 square meters)    Stage 5 End Stage CKD (GFR <15 mL/min/1.73 square meters)  Note: GFR calculation is accurate only with a steady state creatinine    CBC and differential [[id number]]  (Abnormal) Collected: 07/24/24 1355    Lab Status: Final result Specimen: Blood from Arm, Left Updated:  07/24/24 1413     WBC 7.88 Thousand/uL      RBC 3.91 Million/uL      Hemoglobin 12.4 g/dL      Hematocrit 37.2 %      MCV 95 fL      MCH 31.7 pg      MCHC 33.3 g/dL      RDW 15.5 %      MPV 8.8 fL      Platelets 340 Thousands/uL      nRBC 0 /100 WBCs      Segmented % 53 %      Immature Grans % 0 %      Lymphocytes % 37 %      Monocytes % 8 %      Eosinophils Relative 1 %      Basophils Relative 1 %      Absolute Neutrophils 4.21 Thousands/µL      Absolute Immature Grans 0.02 Thousand/uL      Absolute Lymphocytes 2.91 Thousands/µL      Absolute Monocytes 0.61 Thousand/µL      Eosinophils Absolute 0.05 Thousand/µL      Basophils Absolute 0.08 Thousands/µL                    CT lower extremity w contrast left   Final Result by Beltran Mcgrath MD (07/24 1557)      Large dorsal midfoot wound without subjacent drainable abscess or CT findings of osteomyelitis.      Incidental note of tiny nonocclusive superficial venous thrombus in the lateral plantar arch venous plexus. Clinical significance is questionable.      Workstation performed: [id number]               Procedures  Procedures      ED Course  ED Course as of 07/24/24 1932 Wed Jul 24, 2024   1413 CBC and differential(!)  CBC unremarkable.   1425 Basic metabolic panel(!)  Remarkable for elevated creatinine at 1.62 from 1.33 two months ago. Mildly elevated BUN.   Will give IV fluids.   1600 Reassessed and denies any acute complaints at this time.   1617 CT lower extremity w contrast left  IMPRESSION:     Large dorsal midfoot wound without subjacent drainable abscess or CT findings of osteomyelitis.     Incidental note of tiny nonocclusive superficial venous thrombus in the lateral plantar arch venous plexus. Clinical significance is questionable.   1700 Sedimentation rate, automated(!)  Remarkable for elevated sed rate.   1717 C-reactive protein(!)  Remarkable for elevated CRP.   1732 Patient reassessed and is requesting more pain medicine.  Will give a second  dose of Dilaudid.                             SBIRT 22yo+      Flowsheet Row Most Recent Value   Initial Alcohol Screen: US AUDIT-C     1. How often do you have a drink containing alcohol? 0 Filed at: 07/24/2024 7971   2. How many drinks containing alcohol do you have on a typical day you are drinking?  0 Filed at: 07/24/2024 1330   3b. FEMALE Any Age, or MALE 65+: How often do you have 4 or more drinks on one occassion? 0 Filed at: 07/24/2024 133   Audit-C Score 0 Filed at: 07/24/2024 1337   JOVANA: How many times in the past year have you...    Used an illegal drug or used a prescription medication for non-medical reasons? Never Filed at: 07/24/2024 1332                  Medical Decision Making  CBC and BMP ordered to check for leukocytosis, anemia, electrolyte abnormalities, and kidney function.  Dilaudid given in the setting of pain.  CT of the left foot ordered due to concern of soft tissue infection.  Podiatry consulted and recommended admission due to concern for infection.  Lower extremity DVT ultrasound ordered because the podiatry team calculated a high risk stratification score.    Differential diagnoses: Soft tissue infection, compartment syndrome, osteomyelitis, worsening foot injury    Amount and/or Complexity of Data Reviewed  Labs: ordered. Decision-making details documented in ED Course.  Radiology: ordered. Decision-making details documented in ED Course.    Risk  Prescription drug management.  Decision regarding hospitalization.          Disposition  Final diagnoses:   Foot injury, left, subsequent encounter   Infected wound - Left dorsal foot.     Time reflects when diagnosis was documented in both MDM as applicable and the Disposition within this note       Time User Action Codes Description Comment    7/24/2024  4:27 PM Anthony García Add [S99.922D] Foot injury, left, subsequent encounter     7/24/2024  5:29 PM Anthony García Add [T14.8XXA,  L08.9] Infected wound     7/24/2024  5:29 PM Anthony García  Modify [T14.8XXA,  L08.9] Infected wound Left dorsal foot.    7/24/2024  5:29 PM Anthony García [S99.922D] Foot injury, left, subsequent encounter     7/24/2024  5:29 PM Anthony García [T14.8XXA,  L08.9] Infected wound Left dorsal foot.          ED Disposition       ED Disposition   Admit    Condition   Stable    Date/Time   Wed Jul 24, 2024 5041    Comment   --             Follow-up Information    None         Patient's Medications    No medications on file     No discharge procedures on file.    PDMP Review       None             ED Provider  Attending physically available and evaluated Cheryl Godinez. I managed the patient along with the ED Attending.    Electronically Signed by           Anthony García DO  07/25/24 8991

## 2024-07-25 LAB
ANION GAP SERPL CALCULATED.3IONS-SCNC: 8 MMOL/L (ref 4–13)
BASOPHILS # BLD AUTO: 0.08 THOUSANDS/ÂΜL (ref 0–0.1)
BASOPHILS NFR BLD AUTO: 1 % (ref 0–1)
BUN SERPL-MCNC: 22 MG/DL (ref 5–25)
CALCIUM SERPL-MCNC: 8.7 MG/DL (ref 8.4–10.2)
CHLORIDE SERPL-SCNC: 106 MMOL/L (ref 96–108)
CO2 SERPL-SCNC: 25 MMOL/L (ref 21–32)
CREAT SERPL-MCNC: 1.51 MG/DL (ref 0.6–1.3)
EOSINOPHIL # BLD AUTO: 0.12 THOUSAND/ÂΜL (ref 0–0.61)
EOSINOPHIL NFR BLD AUTO: 2 % (ref 0–6)
ERYTHROCYTE [DISTWIDTH] IN BLOOD BY AUTOMATED COUNT: 15.7 % (ref 11.6–15.1)
GFR SERPL CREATININE-BSD FRML MDRD: 36 ML/MIN/1.73SQ M
GLUCOSE SERPL-MCNC: 91 MG/DL (ref 65–140)
HCT VFR BLD AUTO: 34.4 % (ref 34.8–46.1)
HGB BLD-MCNC: 11.1 G/DL (ref 11.5–15.4)
IMM GRANULOCYTES # BLD AUTO: 0.01 THOUSAND/UL (ref 0–0.2)
IMM GRANULOCYTES NFR BLD AUTO: 0 % (ref 0–2)
LYMPHOCYTES # BLD AUTO: 3.32 THOUSANDS/ÂΜL (ref 0.6–4.47)
LYMPHOCYTES NFR BLD AUTO: 47 % (ref 14–44)
MCH RBC QN AUTO: 31.9 PG (ref 26.8–34.3)
MCHC RBC AUTO-ENTMCNC: 32.3 G/DL (ref 31.4–37.4)
MCV RBC AUTO: 99 FL (ref 82–98)
MONOCYTES # BLD AUTO: 0.66 THOUSAND/ÂΜL (ref 0.17–1.22)
MONOCYTES NFR BLD AUTO: 9 % (ref 4–12)
NEUTROPHILS # BLD AUTO: 2.84 THOUSANDS/ÂΜL (ref 1.85–7.62)
NEUTS SEG NFR BLD AUTO: 41 % (ref 43–75)
NRBC BLD AUTO-RTO: 0 /100 WBCS
PLATELET # BLD AUTO: 272 THOUSANDS/UL (ref 149–390)
PMV BLD AUTO: 8.6 FL (ref 8.9–12.7)
POTASSIUM SERPL-SCNC: 3.8 MMOL/L (ref 3.5–5.3)
RBC # BLD AUTO: 3.48 MILLION/UL (ref 3.81–5.12)
SODIUM SERPL-SCNC: 139 MMOL/L (ref 135–147)
WBC # BLD AUTO: 7.03 THOUSAND/UL (ref 4.31–10.16)

## 2024-07-25 PROCEDURE — 97116 GAIT TRAINING THERAPY: CPT

## 2024-07-25 PROCEDURE — 85025 COMPLETE CBC W/AUTO DIFF WBC: CPT

## 2024-07-25 PROCEDURE — 97163 PT EVAL HIGH COMPLEX 45 MIN: CPT

## 2024-07-25 PROCEDURE — 36415 COLL VENOUS BLD VENIPUNCTURE: CPT

## 2024-07-25 PROCEDURE — 99232 SBSQ HOSP IP/OBS MODERATE 35: CPT | Performed by: PODIATRIST

## 2024-07-25 PROCEDURE — 97166 OT EVAL MOD COMPLEX 45 MIN: CPT

## 2024-07-25 PROCEDURE — 80048 BASIC METABOLIC PNL TOTAL CA: CPT

## 2024-07-25 RX ADMIN — OXYCODONE HYDROCHLORIDE AND ACETAMINOPHEN 1 TABLET: 5; 325 TABLET ORAL at 16:46

## 2024-07-25 RX ADMIN — OXYCODONE HYDROCHLORIDE AND ACETAMINOPHEN 1 TABLET: 5; 325 TABLET ORAL at 10:29

## 2024-07-25 RX ADMIN — DOCUSATE SODIUM 100 MG: 100 CAPSULE, LIQUID FILLED ORAL at 10:14

## 2024-07-25 RX ADMIN — CEFAZOLIN SODIUM 2000 MG: 2 SOLUTION INTRAVENOUS at 03:47

## 2024-07-25 RX ADMIN — HYDROMORPHONE HYDROCHLORIDE 0.2 MG: 0.2 INJECTION, SOLUTION INTRAMUSCULAR; INTRAVENOUS; SUBCUTANEOUS at 07:47

## 2024-07-25 RX ADMIN — OXYCODONE HYDROCHLORIDE AND ACETAMINOPHEN 1 TABLET: 5; 325 TABLET ORAL at 04:18

## 2024-07-25 RX ADMIN — CEFAZOLIN SODIUM 2000 MG: 2 SOLUTION INTRAVENOUS at 16:57

## 2024-07-25 RX ADMIN — CEFAZOLIN SODIUM 2000 MG: 2 SOLUTION INTRAVENOUS at 10:15

## 2024-07-25 RX ADMIN — DOCUSATE SODIUM 100 MG: 100 CAPSULE, LIQUID FILLED ORAL at 16:46

## 2024-07-25 RX ADMIN — HYDROMORPHONE HYDROCHLORIDE 0.2 MG: 0.2 INJECTION, SOLUTION INTRAMUSCULAR; INTRAVENOUS; SUBCUTANEOUS at 21:22

## 2024-07-25 RX ADMIN — HYDROMORPHONE HYDROCHLORIDE 0.2 MG: 0.2 INJECTION, SOLUTION INTRAMUSCULAR; INTRAVENOUS; SUBCUTANEOUS at 15:13

## 2024-07-25 RX ADMIN — HYDROMORPHONE HYDROCHLORIDE 0.2 MG: 0.2 INJECTION, SOLUTION INTRAMUSCULAR; INTRAVENOUS; SUBCUTANEOUS at 01:13

## 2024-07-25 NOTE — UTILIZATION REVIEW
Initial Clinical Review    Admission: Date/Time/Statement:   Admission Orders (From admission, onward)       Ordered        07/24/24 1728  INPATIENT ADMISSION  Once                          Orders Placed This Encounter   Procedures    INPATIENT ADMISSION     Standing Status:   Standing     Number of Occurrences:   1     Order Specific Question:   Level of Care     Answer:   Med Surg [16]     Order Specific Question:   Estimated length of stay     Answer:   More than 2 Midnights     Order Specific Question:   Certification     Answer:   I certify that inpatient services are medically necessary for this patient for a duration of greater than two midnights. See H&P and MD Progress Notes for additional information about the patient's course of treatment.     ED Arrival Information       Expected   -    Arrival   7/24/2024 11:15    Acuity   Urgent              Means of arrival   Walk-In    Escorted by   Self    Service   Podiatry    Admission type   Emergency              Arrival complaint   Extremity injury             Chief Complaint   Patient presents with    Leg Injury     Patient was seen on the 19th for a left foot injury. She arrives today because her PCP told her the foot is now infected. Foot is red and bruised. Would is bleeding, skin is peeling.,        Initial Presentation: 64 y.o. female to ED via walk-in from home  Present to ED with pain in the left foot secondary to trauma to the foot.  Patient reports that she dropped a drawer on her left foot from a height of 4 feet. She previously came to the ED on 07/09/24 for this injury and an I&D was done in the emergency department. Patient also mentions that the swelling and ecchymosis has been spreading in her lower extremity as she has been unable to stay off of her foot. She mentions left foot pins and needles. She was at her primary doctor's office today who advised her to come to the ED   PMHX: Hypertension, smoking history 25 years ago, rheumatoid  arthritis, and past heart attack.    Admitted to [facility] with DX: Post-traumatic hematoma of left foot   on exam: hypertensive; cellulitis noted to the left foot. Severe pain 10/10; wound care consisting of Betadine DSD with ACE compression.  Cr 1.62  PLAN: cont iv abx; pain control (see below); monitor labs; PT/ OT eval - tx; f/u Venous duplex left leg; WBAT to left heel in surgical shoe; CM consulted - possible placement; cont wound care      Anticipated Length of Stay/Certification Statement: Patient will be admitted under Podiatry service for cellulitic left foot post-traumatic hematoma       Date: 7/25/24        Day 2  The patient reports Pain to the left foot which was improved compared to last night.  No plans for podiatric surgical intervention at this time. Continue with local wound care and monitoring of cellulitis. Cr 1.51  Plan: cont iv abx; pain control (see below); monitor labs; PT/ OT eval - tx; f/u Venous duplex left leg; WBAT to left heel in surgical shoe; CM consulted - possible placement; cont wound care      Date: 7/26/24      Day 3: Has surpassed a 2nd midnight with active treatments and services.  No acute events overnight. The patient reports moderate pain that is well-controlled with pain medication and icing behind the knee.   Patient stable for discharge today. WBAT to left foot in a surgical shoe using a rolling walker             ED Triage Vitals   Temperature Pulse Respirations Blood Pressure SpO2 Pain Score   07/24/24 1120 07/24/24 1120 07/24/24 1120 07/24/24 1120 07/24/24 1120 07/24/24 1445   98.1 °F (36.7 °C) 88 16 (!) 183/94 100 % 10 - Worst Possible Pain     Weight (last 2 days)       Date/Time Weight    07/25/24 0747 66.7 (147)            Vital Signs (last 3 days)       Date/Time Temp Pulse Resp BP MAP (mmHg) SpO2 O2 Device Patient Position - Orthostatic VS Pain    07/25/24 1029 -- -- -- -- -- -- -- -- 9    07/25/24 0900 -- -- -- -- -- -- None (Room air) -- 9    07/25/24 0747 97.5 °F  (36.4 °C) 90 18 168/81 -- -- -- -- 10 - Worst Possible Pain    07/25/24 0657 -- 76 16 168/81 -- 98 % None (Room air) Lying 8    07/25/24 0603 -- 73 15 148/69 -- 99 % -- -- --    07/25/24 0418 -- -- -- -- -- -- -- -- 9    07/25/24 0245 -- 74 16 168/98 122 98 % None (Room air) Lying --    07/24/24 2100 -- 82 -- 176/76 109 97 % -- -- --    07/24/24 1816 -- 78 16 175/86 -- 98 % None (Room air) Lying --    07/24/24 1700 -- 80 -- 169/82 118 97 % None (Room air) -- --    07/24/24 1549 -- 75 16 167/75 -- 100 % None (Room air) -- --    07/24/24 1445 -- -- -- -- -- -- -- -- 10 - Worst Possible Pain    07/24/24 1120 98.1 °F (36.7 °C) 88 16 183/94 111 100 % None (Room air) Sitting --              Pertinent Labs/Diagnostic Test Results:   Radiology:  VAS lower limb venous duplex study, unilateral/limited   Final Interpretation by Umer Alfaro DO (07/24 7881)      CT lower extremity w contrast left   Final Interpretation by Beltran Mcgrath MD (07/24 9267)      Large dorsal midfoot wound without subjacent drainable abscess or CT findings of osteomyelitis.      Incidental note of tiny nonocclusive superficial venous thrombus in the lateral plantar arch venous plexus. Clinical significance is questionable.      Workstation performed: [id number]             Results from last 7 days   Lab Units 07/25/24  0631 07/24/24  1355   WBC Thousand/uL 7.03 7.88   HEMOGLOBIN g/dL 11.1* 12.4   HEMATOCRIT % 34.4* 37.2   PLATELETS Thousands/uL 272 340   TOTAL NEUT ABS Thousands/µL 2.84 4.21        Results from last 7 days   Lab Units 07/25/24  0641 07/24/24  1355   SODIUM mmol/L 139 140   POTASSIUM mmol/L 3.8 3.8   CHLORIDE mmol/L 106 105   CO2 mmol/L 25 25   ANION GAP mmol/L 8 10   BUN mg/dL 22 26*   CREATININE mg/dL 1.51* 1.62*   EGFR ml/min/1.73sq m 36 33   CALCIUM mg/dL 8.7 9.2        Results from last 7 days   Lab Units 07/25/24  0641 07/24/24  1355   GLUCOSE RANDOM mg/dL 91 142*              Results from last 7 days   Lab Units  07/24/24  1355   CRP mg/L 11.2*   SED RATE mm/hour 44*              ED Treatment-Medication Administration from 07/24/2024 1115 to 07/25/2024 0801         Date/Time Order Dose Route Action     07/24/2024 1445 HYDROmorphone (DILAUDID) injection 0.5 mg 0.5 mg Intravenous Given     07/24/2024 1445 sodium chloride 0.9 % bolus 1,000 mL 1,000 mL Intravenous New Bag     07/24/2024 1501 iohexol (OMNIPAQUE) 350 MG/ML injection (SINGLE-DOSE) 100 mL 100 mL Intravenous Given     07/24/2024 1817 HYDROmorphone (DILAUDID) injection 0.5 mg 0.5 mg Intravenous Given     07/24/2024 1850 ceFAZolin (ANCEF) IVPB (premix in dextrose) 2,000 mg 50 mL 2,000 mg Intravenous New Bag     07/25/2024 0347 ceFAZolin (ANCEF) IVPB (premix in dextrose) 2,000 mg 50 mL 2,000 mg Intravenous New Bag     07/25/2024 0113 HYDROmorphone HCl (DILAUDID) injection 0.2 mg 0.2 mg Intravenous Given     07/25/2024 0747 HYDROmorphone HCl (DILAUDID) injection 0.2 mg 0.2 mg Intravenous Given     07/24/2024 2123 oxyCODONE-acetaminophen (PERCOCET) 5-325 mg per tablet 1 tablet 1 tablet Oral Given     07/25/2024 0418 oxyCODONE-acetaminophen (PERCOCET) 5-325 mg per tablet 1 tablet 1 tablet Oral Given            Past Medical History:   Diagnosis Date    Arthritis     COPD (chronic obstructive pulmonary disease) (HCC)     Depression     History of heart attack     27 years ago    Hypertension      Present on Admission:   Left foot pain   Post-traumatic hematoma of left foot   Cellulitis of left foot      Admitting Diagnosis: Infected wound [T14.8XXA, L08.9]  Leg injury [S89.90XA]  Foot injury, left, subsequent encounter [S99.922D]  Hematoma of left foot [S90.32XA]    Age/Sex: 64 y.o. female    Admission Orders: SCDs; I/O; regular diet    Scheduled Medications:  cefazolin, 2,000 mg, Intravenous, Q8H  docusate sodium, 100 mg, Oral, BID      Continuous IV Infusions: None       PRN Meds:  acetaminophen, 650 mg, Oral, Q6H PRN  calcium carbonate, 1,000 mg, Oral, Daily  PRN  HYDROcodone-acetaminophen, 1 tablet, Oral, Q6H PRN  HYDROmorphone, 0.2 mg, Intravenous, Q6H PRN  (7/25 recd x2)  ondansetron, 4 mg, Intravenous, Q6H PRN  oxyCODONE-acetaminophen, 1 tablet, Oral, Q6H PRN  (7/24 recd x1)   (7/25 recd x2)  HYDROmorphone (DILAUDID) injection 0.5 mg,Intravenous, Once (rec'd 7/24)       IP CONSULT TO PODIATRY  IP CONSULT TO CASE MANAGEMENT    Network Utilization Review Department  ATTENTION: Please call with any questions or concerns to [phone] and carefully listen to the prompts so that you are directed to the right person. All voicemails are confidential.   For Discharge needs, contact Care Management DC Support Team at [phone] opt. 2  Send all requests for admission clinical reviews, approved or denied determinations and any other requests to dedicated fax number below belonging to the campus where the patient is receiving treatment. List of dedicated fax numbers for the Facilities:  FACILITY NAME UR FAX NUMBER   ADMISSION DENIALS (Administrative/Medical Necessity) [phone]   DISCHARGE SUPPORT TEAM (NETWORK) [phone]   PARENT CHILD HEALTH (Maternity/NICU/Pediatrics) [phone]   [facility] [phone]   [facility] [phone]   [facility] [phone]   [facility] [phone]   [facility] [phone]   [facility] [phone]   [facility] [phone]   [facility] [phone]   [facility] [phone]   [facility] [phone]   [facility] [phone]   [facility] [phone]

## 2024-07-25 NOTE — PROGRESS NOTES
Podiatry - Progress Note  Patient: Cheryl Godinez 64 y.o. female   MRN: [id number]  PCP: No primary care provider on file.  Unit/Bed#: [facility] 804-01 Encounter: [id number]  Date Of Visit: 07/25/24    ASSESSMENT:    Cheryl Godinez is a 64 y.o. female with:    Post-traumatic hematoma of the left foot  Cellulitis  Left foot pain  Hypertension      PLAN:    Dressings changed at bedside with all questions and concerns addressed.  Left foot cellulitis has improved from encounter on 7/24, including reduced pain, redness and swelling of extremity.  Area of ecchymosis remains to left dorsal foot.  No plans for podiatric surgical intervention at this time.  Continue with local wound care and monitoring of cellulitis  Patient remains afebrile with no noted leukocytosis.  Will continue to trend labs and vitals while inpatient  Cellulitis and edema of left foot noticeably improved from last visit  Patient advised she should continue to be weightbearing as tolerated to left heel in surgical shoe.  Instructed her to remain off the foot is much as possible and to rest, ice, and elevate while in bed  Continue IV ancef  Left foot x-rays reviewed: No acute fracture or dislocation, no acute osseous abnormality, prominent dorsal soft tissue swelling over the metatarsals.  Continue elevation, rest and ice pack behind the knee to reduce pain.  Follow-up case management recommendations.  They have ordered a wheelchair for patient  Follow-up SLIM recommendations.  Follow-up PT and OT recommendations.  Elevation and offloading on green foam wedges or pillows when non-ambulatory.  Appreciate consulting services for recommendations and management.     Antibiotics started: Ancef  Pharmacologic VTE Prophylaxis: Reason for no pharmacologic prophylaxis low risk    Mechanical VTE Prophylaxis: sequential compression device   Weightbearing status: Weightbearing as tolerated to left heel in surgical shoe    Disposition:  The patient will  stay in the hospital for the above reasons.    SUBJECTIVE:     The patient was seen, evaluated, and assessed at bedside today. The patient was awake, alert, and in no acute distress. No acute events overnight. The patient reports Pain to the left foot which was improved compared to last night. Patient denies N/V/F/chills/SOB/CP.      OBJECTIVE:     Vitals:   /81 (BP Location: Right arm)   Pulse 76   Temp 98.1 °F (36.7 °C) (Oral)   Resp 16   SpO2 98%     Temp (24hrs), Av.1 °F (36.7 °C), Min:98.1 °F (36.7 °C), Max:98.1 °F (36.7 °C)      Physical Exam:     Lungs: Non labored breathing  Abdomen: Soft, non-tender.  Lower Extremity:  Cardiovascular status at baseline from admission.  Neurological status at baseline from admission.  Musculoskeletal status at baseline from admission. No calf tenderness noted.     Closed Hematoma noted to left forefoot dorsally in the 4th/5th interspace with pain on palpation and mild cellulitis  Entire left forefoot and midfoot are edematous and ecchymotic  Middle of hematoma is an eschar, no bogginess noted. No odor, crepitus, fluctuance.  Cellulitis and edema has noticeably improved since last note    Clinical Images 24:      Additional Data:     Labs:    Results from last 7 days   Lab Units 24  0631   WBC Thousand/uL 7.03   HEMOGLOBIN g/dL 11.1*   HEMATOCRIT % 34.4*   PLATELETS Thousands/uL 272   SEGS PCT % 41*   LYMPHO PCT % 47*   MONO PCT % 9   EOS PCT % 2     Results from last 7 days   Lab Units 24  0641   POTASSIUM mmol/L 3.8   CHLORIDE mmol/L 106   CO2 mmol/L 25   BUN mg/dL 22   CREATININE mg/dL 1.51*   CALCIUM mg/dL 8.7           * I Have Reviewed All Lab Data Listed Above.    Recent Cultures (last 7 days):               Imaging: I have personally reviewed pertinent films in PACS  EKG, Pathology, and Other Studies: I have personally reviewed pertinent reports.    ** Please Note: Portions of the record may have been created with voice recognition  "software. Occasional wrong word or \"sound a like\" substitutions may have occurred due to the inherent limitations of voice recognition software. Read the chart carefully and recognize, using context, where substitutions have occurred. **      "

## 2024-07-25 NOTE — OCCUPATIONAL THERAPY NOTE
Occupational Therapy Evaluation     Patient Name: Cheryl Godinez  Today's Date: 7/25/2024  Problem List  Principal Problem:    Post-traumatic hematoma of left foot  Active Problems:    Left foot pain    Cellulitis of left foot    Hypertension    Past Medical History  Past Medical History:   Diagnosis Date    Arthritis     COPD (chronic obstructive pulmonary disease) (HCC)     Depression     History of heart attack     27 years ago    Hypertension      Past Surgical History  Past Surgical History:   Procedure Laterality Date    CARDIAC PACEMAKER PLACEMENT      FACIAL FRACTURE SURGERY      FEMUR SURGERY      KIDNEY SURGERY      LIVER SURGERY               07/25/24 1214   OT Last Visit   OT Visit Date 07/25/24   Note Type   Note type Evaluation   Pain Assessment   Pain Assessment Tool 0-10   Pain Location/Orientation Orientation: Left;Location: Foot   Pain Onset/Description Onset: Ongoing   Effect of Pain on Daily Activities increased time for functional activities   Patient's Stated Pain Goal No pain   Hospital Pain Intervention(s) Repositioned;Ambulation/increased activity;Elevated;Emotional support   Restrictions/Precautions   Weight Bearing Precautions Per Order Yes   LLE Weight Bearing Per Order (S)  WBAT  (WBAT to L heel in sx shoe)   Braces or Orthoses Other (Comment)  (sx shoe)   Other Precautions Chair Alarm;Bed Alarm;WBS;Multiple lines;Fall Risk;Pain   Home Living   Type of Home House   Home Layout Two level;Bed/bath upstairs;Stairs to enter with rails  (6STE, FFOS to bed)   Bathroom Shower/Tub Tub/shower unit   Bathroom Toilet Standard   Home Equipment Walker  (not used)   Additional Comments 2SH, 6STE + FFOS to bedroom   Prior Function   Level of [place] Independent with ADLs;Independent with functional mobility;Independent with IADLS   Lives With Alone  (Pt rents a room in a house. Reports that she has to do everything on her own, doesn't have help for ADLs/IADLs)   Receives Help From  "Friend(s)  (Lives with friend, reports having limited support otherwise)   IADLs Independent with driving;Independent with meal prep;Independent with medication management   Falls in the last 6 months 0   Vocational On disability   Comments Pt reports injury started beginning of July and has gotten worse, but that she spends a lot of time sitting in the chair with elevated legs. Pt reports she rents a bedroom in her friends home with 7 people and 1 bathroom.   Lifestyle   Autonomy IND PTA with ADLs/IADLs. No DME used PTA.   Reciprocal Relationships Rents a room in friends house, limited social support otherwise   Service to Others On SSD   Intrinsic Gratification Reports hobbies don't fit in her room, including embroidery, repurposing.   General   Additional Pertinent History Pt tearful in session, reporting limited social support. Pt does report that she got a new PCP that listed multiple  and supports. Optimistic about prospect   Family/Caregiver Present No   Subjective   Subjective \"I have to do it all myself\"   ADL   Where Assessed Edge of bed   Eating Assistance 7  Independent   Grooming Assistance 7  Independent   UB Bathing Assistance 7  Independent   LB Bathing Assistance 5  Supervision/Setup   UB Dressing Assistance 7  Independent   LB Dressing Assistance 5  Supervision/Setup   Toileting Assistance  7  Independent   Functional Assistance 7  Independent   Bed Mobility   Supine to Sit 5  Supervision   Additional items HOB elevated;Increased time required   Sit to Supine Unable to assess   Additional Comments Pt OOB post session, all needs met, call bell within reach.   Transfers   Sit to Stand 5  Supervision   Additional items Increased time required;Verbal cues   Stand to Sit 5  Supervision   Additional items Increased time required;Verbal cues   Additional Comments VCs to maintain WBS to heel in sx. shoe   Functional Mobility   Functional Mobility 5  Supervision   Additional Comments steps " from bed>chair.   Additional items Rolling walker   Balance   Static Sitting Good   Dynamic Sitting Fair +   Static Standing Fair +   Dynamic Standing Fair   Ambulatory Fair -   Activity Tolerance   Activity Tolerance Patient limited by fatigue;Patient limited by pain   Medical Staff Made Aware PT arin Coello 2/2 increased medical complexity and comorbidities   Nurse Made Aware RN cleared for therapy   RUE Assessment   RUE Assessment WFL   LUE Assessment   LUE Assessment WFL   Hand Function   Gross Motor Coordination Functional   Fine Motor Coordination Functional   Cognition   Overall Cognitive Status WFL   Arousal/Participation Alert;Cooperative   Attention Within functional limits   Orientation Level Oriented X4   Memory Within functional limits   Following Commands Follows all commands and directions without difficulty   Comments Pt very pleasant and cooperative, motivated to participate. Pt reporting limited social support, but otherwise IND with all care.   Assessment   Limitation Decreased ADL status;Decreased endurance   Prognosis Good   Assessment 64 year old pt seen today for an OT evaluation following [facility] 2/2 post-traumatic hematoma of L foot with present symptoms significant for pain, fatigue, weakness, decreased ADL status, decreased functional mobility. Pt  has a past medical history of Arthritis, COPD (chronic obstructive pulmonary disease) (HCC), Depression, History of heart attack, and Hypertension. Pt reported living alone in a 2SH, 6STE with FFOS to bed/bath. Pt reports that she rents a room in the home with 7 people in it. Is IND with all ADLs/IADLs PTA with limited social support per pt. +. No DME used PTA. Pt very pleasant and cooperative t/o session. Pt completed functional bed mobility with SUP. SUP for functional STS txfs.  SUP for functional mobility to go household distances. Pt was IND for UB ADLs and SUP for LB ADLs. The patient's raw score  on the AM-PAC Daily Activity Inpatient Short Form is 22. A raw score of greater than or equal to 19 suggests the patient may benefit from discharge to home. Please refer to the recommendation of the Occupational Therapist for safe discharge planning. Pt is functioning at or near baseline level of function and no further skilled OT needs are identified. At this time, current OT recommendation is Level 4 - no needs upon d/c. Will remain available if skilled acute OT needs arise. D/c OT.   Goals   Patient Goals to get better   Plan   OT Frequency Eval only   Discharge Recommendation   Rehab Resource Intensity Level, OT No post-acute rehabilitation needs   AM-PAC Daily Activity Inpatient   Lower Body Dressing 3   Bathing 3   Toileting 4   Upper Body Dressing 4   Grooming 4   Eating 4   Daily Activity Raw Score 22   Daily Activity Standardized Score (Calc for Raw Score >=11) 47.1   AM-PAC Applied Cognition Inpatient   Following a Speech/Presentation 4   Understanding Ordinary Conversation 4   Taking Medications 4   Remembering Where Things Are Placed or Put Away 4   Remembering List of 4-5 Errands 4   Taking Care of Complicated Tasks 4   Applied Cognition Raw Score 24   Applied Cognition Standardized Score 62.21   End of Consult   Education Provided Yes   Patient Position at End of Consult Bedside chair;All needs within reach   Nurse Communication Nurse aware of consult         Gerber Perales MS, OTR/L     MOCA CERTIFIED RATER ID [id number]

## 2024-07-25 NOTE — CASE MANAGEMENT
Case Management Assessment & Discharge Planning Note    Patient name Cheryl Godinez  Location [facility] 804/[facility] 804-01 MRN [id number]  : 1960 Date 2024       Current Admission Date: 2024  Current Admission Diagnosis:Post-traumatic hematoma of left foot   Patient Active Problem List    Diagnosis Date Noted Date Diagnosed    Leg injury 2024     Left foot pain 2024     Post-traumatic hematoma of left foot 2024     Cellulitis of left foot 2024     Hypertension 2024     Heart attack (HCC) 2024       LOS (days): 1  Geometric Mean LOS (GMLOS) (days):   Days to GMLOS:     OBJECTIVE:    Risk of Unplanned Readmission Score: 10.05         Current admission status: Inpatient       Preferred Pharmacy:   [id number] - [place], [place] - [address] [address]  Phone: [phone] Fax: [phone]    Primary Care Provider: No primary care provider on file.    Primary Insurance: [employer] [facility] REP  Secondary Insurance:     ASSESSMENT:  Active Health Care Proxies    There are no active Health Care Proxies on file.                 Readmission Root Cause  30 Day Readmission: No    Patient Information  Admitted from:: Home  Mental Status: Alert  During Assessment patient was accompanied by: Not accompanied during assessment  Assessment information provided by:: Patient  Primary Caregiver: Self  Support Systems: Self  County of Residence: [place]  What [facility] do you live in?: [place]  Home entry access options. Select all that apply.: Stairs  Number of steps to enter home.: 6  Type of Current Residence: Other (Comment) (pt rents room)  Living Arrangements: Lives Alone  Is patient a ?: No    Activities of Daily Living Prior to Admission  Functional Status: Independent  Completes ADLs independently?: Yes  Ambulates independently?: Yes  Does patient use assisted devices?: No  Does patient currently own DME?: No  Does patient have a  history of Outpatient Therapy (PT/OT)?: Yes  Does the patient have a history of Short-Term Rehab?: No  Does patient have a history of HHC?: No  Does patient currently have HHC?: No         Patient Information Continued  Income Source: SSI/SSD  Does patient have prescription coverage?: Yes  Does patient receive dialysis treatments?: No  Does patient have a history of substance abuse?: No  Does patient have a history of Mental Health Diagnosis?: Yes (bipolar, BPD, and depression)  Is patient receiving treatment for mental health?: No. Patient declined treatment information.  Has patient received inpatient treatment related to mental health in the last 2 years?: No         Means of Transportation  Means of Transport to Appts:: Drives Self      Social Determinants of Health (SDOH)      Flowsheet Row Most Recent Value   Housing Stability    In the last 12 months, was there a time when you were not able to pay the mortgage or rent on time? N   In the past 12 months, how many times have you moved where you were living? 1   At any time in the past 12 months, were you homeless or living in a shelter (including now)? N   Transportation Needs    In the past 12 months, has lack of transportation kept you from medical appointments or from getting medications? no   In the past 12 months, has lack of transportation kept you from meetings, work, or from getting things needed for daily living? No   Food Insecurity    Within the past 12 months, you worried that your food would run out before you got the money to buy more. Sometimes   Within the past 12 months, the food you bought just didn't last and you didn't have money to get more. Sometimes   Utilities    In the past 12 months has the electric, gas, oil, or water company threatened to shut off services in your home? No            DISCHARGE DETAILS:    Discharge planning discussed with:: Patient  Freedom of Choice: Yes     CM contacted family/caregiver?: No- see comments  Were  Treatment Team discharge recommendations reviewed with patient/caregiver?: Yes  Did patient/caregiver verbalize understanding of patient care needs?: Yes  Were patient/caregiver advised of the risks associated with not following Treatment Team discharge recommendations?: Yes    Additional Comments: CM met w/ pt at bedside to introduce self and role and complete assessment. Pt lives alone in a room she rents on 2nd floor w/ 6 [address]ER[address]. Pt stated she ambulates/completes ADLs independently at baseline. Pt has HX of OP pt. Pt stated no HX of D&A. Pt has depression, BPD, and bipolar. Pt declined treatment options. Pt stated she would like some resources on food campos in her area. CM to use [employer] help to find resources and deliver them to bedside. CM to continue to follow.     CM reviewed d/c planning process including the following: identifying help at home, patient preference for d/c planning needs, Discharge Lounge, [facility] Meds to Bed program, availability of treatment team to discuss questions or concerns patient and/or family may have regarding understanding medications and recognizing signs and symptoms once discharged.  CM also encouraged patient to follow up with all recommended appointments after discharge. Patient advised of importance for patient and family to participate in managing patient’s medical well being.       Addendum 12:05: CM provided pt w/ food bank resources at bedside. CM team to continue to follow.

## 2024-07-26 VITALS
RESPIRATION RATE: 16 BRPM | WEIGHT: 147 LBS | HEIGHT: 64 IN | TEMPERATURE: 98.5 F | OXYGEN SATURATION: 96 % | DIASTOLIC BLOOD PRESSURE: 74 MMHG | HEART RATE: 79 BPM | SYSTOLIC BLOOD PRESSURE: 152 MMHG | BODY MASS INDEX: 25.1 KG/M2

## 2024-07-26 PROBLEM — M79.672 LEFT FOOT PAIN: Status: RESOLVED | Noted: 2024-07-24 | Resolved: 2024-07-26

## 2024-07-26 PROBLEM — L03.116 CELLULITIS OF LEFT FOOT: Status: RESOLVED | Noted: 2024-07-24 | Resolved: 2024-07-26

## 2024-07-26 LAB
ANION GAP SERPL CALCULATED.3IONS-SCNC: 8 MMOL/L (ref 4–13)
BASOPHILS # BLD AUTO: 0.08 THOUSANDS/ÂΜL (ref 0–0.1)
BASOPHILS NFR BLD AUTO: 1 % (ref 0–1)
BUN SERPL-MCNC: 18 MG/DL (ref 5–25)
CALCIUM SERPL-MCNC: 8.7 MG/DL (ref 8.4–10.2)
CHLORIDE SERPL-SCNC: 107 MMOL/L (ref 96–108)
CO2 SERPL-SCNC: 24 MMOL/L (ref 21–32)
CREAT SERPL-MCNC: 1.38 MG/DL (ref 0.6–1.3)
DME PARACHUTE DELIVERY DATE ACTUAL: NORMAL
DME PARACHUTE DELIVERY DATE REQUESTED: NORMAL
DME PARACHUTE ITEM DESCRIPTION: NORMAL
DME PARACHUTE ORDER STATUS: NORMAL
DME PARACHUTE SUPPLIER NAME: NORMAL
DME PARACHUTE SUPPLIER PHONE: NORMAL
EOSINOPHIL # BLD AUTO: 0.14 THOUSAND/ÂΜL (ref 0–0.61)
EOSINOPHIL NFR BLD AUTO: 2 % (ref 0–6)
ERYTHROCYTE [DISTWIDTH] IN BLOOD BY AUTOMATED COUNT: 15.5 % (ref 11.6–15.1)
GFR SERPL CREATININE-BSD FRML MDRD: 40 ML/MIN/1.73SQ M
GLUCOSE SERPL-MCNC: 95 MG/DL (ref 65–140)
HCT VFR BLD AUTO: 30.7 % (ref 34.8–46.1)
HGB BLD-MCNC: 10 G/DL (ref 11.5–15.4)
IMM GRANULOCYTES # BLD AUTO: 0.02 THOUSAND/UL (ref 0–0.2)
IMM GRANULOCYTES NFR BLD AUTO: 0 % (ref 0–2)
LYMPHOCYTES # BLD AUTO: 3.02 THOUSANDS/ÂΜL (ref 0.6–4.47)
LYMPHOCYTES NFR BLD AUTO: 47 % (ref 14–44)
MCH RBC QN AUTO: 31.3 PG (ref 26.8–34.3)
MCHC RBC AUTO-ENTMCNC: 32.6 G/DL (ref 31.4–37.4)
MCV RBC AUTO: 96 FL (ref 82–98)
MONOCYTES # BLD AUTO: 0.58 THOUSAND/ÂΜL (ref 0.17–1.22)
MONOCYTES NFR BLD AUTO: 9 % (ref 4–12)
NEUTROPHILS # BLD AUTO: 2.68 THOUSANDS/ÂΜL (ref 1.85–7.62)
NEUTS SEG NFR BLD AUTO: 41 % (ref 43–75)
NRBC BLD AUTO-RTO: 0 /100 WBCS
PLATELET # BLD AUTO: 245 THOUSANDS/UL (ref 149–390)
PMV BLD AUTO: 8.9 FL (ref 8.9–12.7)
POTASSIUM SERPL-SCNC: 4.4 MMOL/L (ref 3.5–5.3)
RBC # BLD AUTO: 3.19 MILLION/UL (ref 3.81–5.12)
SODIUM SERPL-SCNC: 139 MMOL/L (ref 135–147)
WBC # BLD AUTO: 6.52 THOUSAND/UL (ref 4.31–10.16)

## 2024-07-26 PROCEDURE — 97116 GAIT TRAINING THERAPY: CPT

## 2024-07-26 PROCEDURE — 99238 HOSP IP/OBS DSCHRG MGMT 30/<: CPT | Performed by: PODIATRIST

## 2024-07-26 PROCEDURE — 80048 BASIC METABOLIC PNL TOTAL CA: CPT

## 2024-07-26 PROCEDURE — 85025 COMPLETE CBC W/AUTO DIFF WBC: CPT

## 2024-07-26 RX ORDER — TRAMADOL HYDROCHLORIDE 50 MG/1
50 TABLET ORAL EVERY 8 HOURS PRN
Qty: 30 TABLET | Refills: 0 | Status: SHIPPED | OUTPATIENT
Start: 2024-07-26 | End: 2024-08-04

## 2024-07-26 RX ORDER — CEPHALEXIN 500 MG/1
500 CAPSULE ORAL EVERY 6 HOURS SCHEDULED
Qty: 56 CAPSULE | Refills: 0 | Status: SHIPPED | OUTPATIENT
Start: 2024-07-26 | End: 2024-08-09

## 2024-07-26 RX ADMIN — CEFAZOLIN SODIUM 2000 MG: 2 SOLUTION INTRAVENOUS at 01:30

## 2024-07-26 RX ADMIN — OXYCODONE HYDROCHLORIDE AND ACETAMINOPHEN 1 TABLET: 5; 325 TABLET ORAL at 09:46

## 2024-07-26 RX ADMIN — CEFAZOLIN SODIUM 2000 MG: 2 SOLUTION INTRAVENOUS at 09:47

## 2024-07-26 RX ADMIN — OXYCODONE HYDROCHLORIDE AND ACETAMINOPHEN 1 TABLET: 5; 325 TABLET ORAL at 01:21

## 2024-07-26 RX ADMIN — DOCUSATE SODIUM 100 MG: 100 CAPSULE, LIQUID FILLED ORAL at 09:46

## 2024-07-26 NOTE — DISCHARGE INSTR - AVS FIRST PAGE
Discharge Instructions - Podiatry    Weight Bearing Status: Weight bearing as tolerated to left heel in surgical shoe with use of rolling walker                  Pain: Continue analgesics as directed    Follow-up appointment instructions: Please make an appointment within one week of discharge with Dr. Bills at [facility]. Contact sooner if any increase in pain, or signs of infection occur    Wound Care: Leave dressings clean, dry, and intact. Dressings are to be changed every other day. Clean surface of wound with betadine ointment and wrap with Gauze and secure in placed with ACE bandage with moderate compression.

## 2024-07-26 NOTE — DISCHARGE SUMMARY
PODIATRY DISCHARGE SUMMARY     Patient Name: Cheryl Godinez   Age & Sex: 64 y.o. female   MRN: [id number]  Unit/Bed#: [facility] 804-01   Encounter: [id number]  Length of Stay: 2 days    ASSESSMENT:    Cheryl Godinez is a 64 y.o. female with:    Post-traumatic hematoma of the left foot  Cellulitis, resolving  Left foot pain  Hypertension    PLAN:    Dressings changed at bedside.  Eschar to the dorsal left foot noted to be dry and stable, surrounding edema and cellulitis resolving.  No acute clinical signs of infection.  Patient capable of being seen in outpatient setting by podiatry.  Patient is stable for discharge from podiatry standpoint.  Prescribed course of Keflex to be taken to completion and 2-week course of oral tramadol for pain management.  Patient is amenable.  Pharmacy information verified  Case management has coordinated dispense of rolling walker.  Patient parked in the parking lot.  She states she is comfortable to drive home by herself  Appreciated physical therapy/Occupational Therapy recommendation of discharge to home and no needs for further skills at discharge.  Physical therapy has asserted she is stable to ambulate and go up stairs in the surgical shoe to the left foot with assistance of rolling walker  Patient to maintain current dressing at home.  Wound care instructions placed in discharge paperwork.  Patient will be taken care of dressings at home until seen in outpatient setting.  Dressing supplies dispensed.  Emphasized with patient the importance of resting, icing, elevating, compression of the left lower extremity.  She is amenable  Patient to be seen by Dr. Bills in outpatient setting in the next 2 weeks for continued wound check.  Discharge instructions placed in chart.  Emphasized with patient the importance of following up in the outpatient clinic  In-depth discussion was had with patient regarding her treatment plan.  Emphasized with patient the importance of  completing her entire course of antibiotic.  She asserts her understanding and is amenable to the plan.  All questions and concerns addressed    Pharmacologic VTE Prophylaxis: Reason for no pharmacologic prophylaxis low risk    Mechanical VTE Prophylaxis: sequential compression device   Weightbearing status: Weightbearing as tolerated to left heel in surgical shoe with use of rolling walker       Antibiotics: Cephalexin po outpatient  Weightbearing status: WBAT to left foot in a surgical shoe using a rolling walker    Disposition:  Patient stable for discharge today.    SUBJECTIVE     The patient was seen, evaluated, and assessed at bedside today. The patient was awake, alert, and in no acute distress. No acute events overnight. The patient reports moderate pain that is well-controlled with pain medication and icing behind the knee. Patient denies N/V/F/chills/SOB/CP.    Review of Systems  Constitutional: Negative.    HENT: Negative.    Eyes: Negative.    Respiratory: Negative.    Cardiovascular: Negative.    Gastrointestinal: Negative.    Musculoskeletal: Left foot pain  Skin: Left foot pain, swelling, resolving cellulitis  Neurological: None  Psych: Negative.    OBJECTIVE     Physical Exam:     General: Alert, cooperative and no distress  Lungs: Non labored breathing  Abdomen: Soft, non-tender.  Lower extremity exam:  Cardiovascular status at baseline.  Neurological status at baseline.  Musculoskeletal status at baseline. No calf tenderness noted bilaterally.     Wound #: 1  Location: left dorsal forefoot  Length 3cm: Width 3cm: Depth .1cm:   Deepest Tissue Noted in Base: Eschar  Probe to Bone: No  Peripheral Skin Description: Attached  Granulation: 0% Fibrotic Tissue: 0% Necrotic Tissue: 100%   Drainage Amount: None  Surrounding edema and cellulitis has greatly improved from encounter on 7/25/2024          DETAILS OF HOSPITAL COURSE     Cheryl Godinez is a 64 y.o. female who was admitted on 7/24/2024 due  to left foot pain with posttraumatic hematoma of dorsal left forefoot.  Patient states that she had dropped a drawer on her foot days prior to admission.  Patient had previously been seen in the emergency department on 7/9/2024 for this injury and an I&D was done.  X-rays were negative for fractures or dislocation of foot.  She was thereafter seen at her primary doctor's office who advised her to visit the emergency department once again to get her foot looked at.  Her pain level at admission was 10 out of 10.  Patient had mentioned that she was not able to stay off of her foot as she lives on her own within an room at another couple's house.  Patient also states that she has stairs to get up to her living quarters.  Patient was seen on 7/24 in emergency department and it was decided to admit her for observation and IV antibiotic alongside a PT/OT consult.  Physical therapy and Occupational Therapy cleared patient for home discharge with self-care and no need for further skilled development.  Physical therapy asserts she is able to ambulate and traverse stairs using rolling walker and left foot surgical shoe without issue.  Patient is stable for discharge as cellulitis and swelling associated with the hematoma have greatly improved with IV antibiotics and rest.  Patient is to be discharged  back to home into follow-up with Dr. Bills in the outpatient [facility] clinic.  Patient is to complete 2-week course of Keflex.  Patient dispensed oral tramadol for pain control.  Patient is amenable to this treatment plan.  All questions and concerns addressed    New Medications:  -Tramadol for pain management  -Keflex for abx coverage    DISCHARGE INFORMATION     PCP at Discharge: No primary care provider on file.    Admitting Provider:  Sanju  Admission Date: 7/24/2024     Discharge Provider:  Sanju  Discharge Date: 07/26/24    Discharge Disposition: Home  Discharge Condition: Good  Discharge with Lines: No  Activity  "Restrictions: WBAT to left foot in surgical shoe with use of rolling walker  Medications at Discharge: See after visit summary for reconciled discharge medications provided to patient and family.      Discharge Diagnoses:  Principal Problem:    Post-traumatic hematoma of left foot  Active Problems:    Left foot pain    Cellulitis of left foot    Hypertension      Consulting Providers:  -PT/OT  -Case management    Diagnostic & Therapeutic Procedures Performed:  CT lower extremity w contrast left    Result Date: 7/24/2024  Impression: Large dorsal midfoot wound without subjacent drainable abscess or CT findings of osteomyelitis. Incidental note of tiny nonocclusive superficial venous thrombus in the lateral plantar arch venous plexus. Clinical significance is questionable. Workstation performed: [id number]       Code Status: Level 1 - Full Code  Advance Directive and Living Will:      Power of :    POLST:      FOLLOW-UP     PCP Outpatient Follow-up:    Consulting Providers Follow-up:    Active Issues Requiring Follow-Up:    Discharge Statement:  I spent 25 minutes discharging the patient. This time was spent on the day of discharge. I had direct contact with the patient on the day of discharge. Additional documentation is required if more than 30 minutes were spent on discharge.       Portions of the record may have been created with voice recognition software.  Occasional wrong word or \"sound a like\" substitutions may have occurred due to the inherent limitations of voice recognition software.  Read the chart carefully and recognize, using context, where substitutions have occurred.  ==  Rosalinda Bolton DPM   [facility]  Podiatric Medicine & Surgery          "

## 2024-07-26 NOTE — PHYSICAL THERAPY NOTE
Physical Therapy Treatment Note    Patient's Name: Cheryl Godinez  : 24 1132   PT Last Visit   PT Visit Date 24   Note Type   Note Type Treatment   Pain Assessment   Pain Assessment Tool 0-10   Pain Score No Pain   Restrictions/Precautions   Weight Bearing Precautions Per Order Yes   LLE Weight Bearing Per Order   (to L heel in surgical shoe)   Braces or Orthoses   (surgical shoe LLE)   Other Precautions WBS;Fall Risk   General   Chart Reviewed Yes   Response to Previous Treatment Patient with no complaints from previous session.   Family/Caregiver Present No   Subjective   Subjective Agreeable to mobilize.   Transfers   Sit to Stand 6  Modified independent   Additional items Trapeze bar   Stand to Sit 6  Modified independent   Additional items Increased time required   Additional Comments RW   Ambulation/Elevation   Gait pattern Excessively slow;Short stride;Step to   Gait Assistance 5  Supervision   Additional items Verbal cues   Assistive Device Rolling walker   Distance 80'x2   Stair Management Assistance 5  Supervision   Additional items Verbal cues;Tactile cues   Stair Management Technique One rail R;Nonreciprocal   Ambulation/Elevation Additional Comments Pt's personal RW adjusted to correct height. Good ability to maintain heel weight-bearing LLE in surgical shoe.   Balance   Static Sitting Good   Dynamic Sitting Good   Static Standing Fair +   Dynamic Standing Fair   Ambulatory Fair -  (RW)   Endurance Deficit   Endurance Deficit Yes   Endurance Deficit Description fatigue   Activity Tolerance   Activity Tolerance Patient tolerated treatment well   Medical Staff Made Aware Podiatry   Nurse Made Aware yes   Assessment   Prognosis Excellent   Problem List Decreased endurance;Decreased skin integrity;Decreased strength;Impaired balance;Decreased mobility   Assessment Pt seen for PT treatment session w/ interventions consisting of gait training + stair training. Pt  "demonstrated excellent ability to maintain heel reyna-bearing throughout entirety of session, including on stairs. Educated pt in nonreciprocal sequencing on stairs. Pt has demonstrated everything necessary to d/c home when medically cleared. Continue to recommend HHPT upon d/c.   Barriers to Discharge Inaccessible home environment;Decreased caregiver support   Goals   Patient Goals \"visit my mother in [place] when healed\"   PT Treatment Day 1   Plan   Treatment/Interventions Functional transfer training;LE strengthening/ROM;Elevations;Therapeutic exercise;Endurance training;Equipment eval/education;Patient/family training;Bed mobility;Gait training;Compensatory technique education;Spoke to nursing;Spoke to MD   Progress Progressing toward goals   PT Frequency 2-3x/wk   Discharge Recommendation   Rehab Resource Intensity Level, PT III (Minimum Resource Intensity)   Equipment Recommended Walker   Walker Package Recommended Wheeled walker   Change/add to Walker Package? No   AM-PAC Basic Mobility Inpatient   Turning in Flat Bed Without Bedrails 4   Lying on Back to Sitting on Edge of Flat Bed Without Bedrails 4   Moving Bed to Chair 4   Standing Up From Chair Using Arms 4   Walk in Room 3   Climb 3-5 Stairs With Railing 3   Basic Mobility Inpatient Raw Score 22   Basic Mobility Standardized Score 47.4   [facility] Highest Level Of Mobility   -HLM Goal 7: Walk 25 feet or more   -HLM Achieved 7: Walk 25 feet or more   Education   Education Provided Mobility training;Assistive device   Patient Demonstrates acceptance/verbal understanding   End of Consult   Patient Position at End of Consult Bedside chair;All needs within reach     Wanda Madden, PT, DPT    "

## 2024-07-26 NOTE — CASE MANAGEMENT
Case Management Progress Note    Patient name Cheryl Godinez  Location [facility] 804/[facility] 804-01 MRN [id number]  : 1960 Date 2024       LOS (days): 2  Geometric Mean LOS (GMLOS) (days): 2.6  Days to GMLOS:0.9        OBJECTIVE:        Current admission status: Inpatient  Preferred Pharmacy:   [id number] [place] [address] [address]  Phone: [phone] Fax: [phone]    Primary Care Provider: No primary care provider on file.    [facility]  Secondary Insurance:     PROGRESS NOTE:    CM informed of pt copay for walker being $6.94. CM informed pt who was agreeable. CM delivered walker to bedside. CM team to continue to follow.

## 2024-07-26 NOTE — PLAN OF CARE
Problem: PAIN - ADULT  Goal: Verbalizes/displays adequate comfort level or baseline comfort level  Description: Interventions:  - Encourage patient to monitor pain and request assistance  - Assess pain using appropriate pain scale  - Administer analgesics based on type and severity of pain and evaluate response  - Implement non-pharmacological measures as appropriate and evaluate response  - Consider cultural and social influences on pain and pain management  - Notify physician/advanced practitioner if interventions unsuccessful or patient reports new pain  Outcome: Progressing     Problem: SAFETY ADULT  Goal: Patient will remain free of falls  Description: INTERVENTIONS:  - Educate patient/family on patient safety including physical limitations  - Instruct patient to call for assistance with activity   - Consult OT/PT to assist with strengthening/mobility   - Keep Call bell within reach  - Keep bed low and locked with side rails adjusted as appropriate  - Keep care items and personal belongings within reach  - Initiate and maintain comfort rounds  - Make Fall Risk Sign visible to staff  - Offer Toileting every 2 Hours, in advance of need  - Consider moving patient to room near nurses station  Outcome: Progressing  Goal: Maintain or return to baseline ADL function  Description: INTERVENTIONS:  -  Assess patient's ability to carry out ADLs; assess patient's baseline for ADL function and identify physical deficits which impact ability to perform ADLs (bathing, care of mouth/teeth, toileting, grooming, dressing, etc.)  - Assess/evaluate cause of self-care deficits   - Assess range of motion  - Assess patient's mobility; develop plan if impaired  - Assess patient's need for assistive devices and provide as appropriate  - Encourage maximum independence but intervene and supervise when necessary  - Involve family in performance of ADLs  - Assess for home care needs following discharge   - Consider OT consult to assist  with ADL evaluation and planning for discharge  - Provide patient education as appropriate  Outcome: Progressing  Goal: Maintains/Returns to pre admission functional level  Description: INTERVENTIONS:  - Perform AM-PAC 6 Click Basic Mobility/ Daily Activity assessment daily.  - Set and communicate daily mobility goal to care team and patient/family/caregiver.   - Collaborate with rehabilitation services on mobility goals if consulted  - Stand patient 3 times a day  - Ambulate patient 3 times a day  - Out of bed to chair 3 times a day   - Out of bed for meals 3 times a day  - Out of bed for toileting  - Record patient progress and toleration of activity level   Outcome: Progressing     Problem: DISCHARGE PLANNING  Goal: Discharge to home or other facility with appropriate resources  Description: INTERVENTIONS:  - Identify barriers to discharge w/patient and caregiver  - Arrange for needed discharge resources and transportation as appropriate  - Identify discharge learning needs (meds, wound care, etc.)  - Arrange for interpretive services to assist at discharge as needed  - Refer to Case Management Department for coordinating discharge planning if the patient needs post-hospital services based on physician/advanced practitioner order or complex needs related to functional status, cognitive ability, or social support system  Outcome: Progressing

## 2024-07-26 NOTE — PLAN OF CARE
Problem: PHYSICAL THERAPY ADULT  Goal: Performs mobility at highest level of function for planned discharge setting.  See evaluation for individualized goals.  Description: Treatment/Interventions: Functional transfer training, Elevations, Therapeutic exercise, Bed mobility, Gait training, Spoke to nursing, Spoke to case management, OT  Equipment Recommended: Walker       See flowsheet documentation for full assessment, interventions and recommendations.  7/26/2024 1306 by Wanda Madden, PT  Outcome: Progressing  Note: Prognosis: Excellent  Problem List: Decreased endurance, Decreased skin integrity, Decreased strength, Impaired balance, Decreased mobility  Assessment: Pt seen for PT treatment session w/ interventions consisting of gait training + stair training. Pt demonstrated excellent ability to maintain heel reyna-bearing throughout entirety of session, including on stairs. Educated pt in nonreciprocal sequencing on stairs. Pt has demonstrated everything necessary to d/c home when medically cleared. Continue to recommend HHPT upon d/c.  Barriers to Discharge: Inaccessible home environment, Decreased caregiver support     Rehab Resource Intensity Level, PT: III (Minimum Resource Intensity)    See flowsheet documentation for full assessment.     7/26/2024 1306 by Wanda Madden, PT  Outcome: Progressing  Note: Prognosis: Excellent  Problem List: Decreased endurance, Decreased skin integrity, Decreased strength, Impaired balance, Decreased mobility  Assessment: Pt seen for PT treatment session w/ interventions consisting of gait training + stair training. Pt demonstrated excellent ability to maintain heel reyna-bearing throughout entirety of session, including on stairs. Educated pt in nonreciprocal sequencing on stairs. Pt has demonstrated everything necessary to d/c home when medically cleared. Continue to recommend HHPT upon d/c.  Barriers to Discharge: Inaccessible home environment, Decreased caregiver  support     Rehab Resource Intensity Level, PT: III (Minimum Resource Intensity)    See flowsheet documentation for full assessment.

## 2024-07-31 ENCOUNTER — TELEPHONE (OUTPATIENT)
Age: 64
End: 2024-07-31

## 2024-07-31 NOTE — TELEPHONE ENCOUNTER
Caller: Cheryl Godinez    Doctor and/or Office: Dr. Bills/[facility] only    #: [phone]    Escalation: Appointment/Was told to follow up w/ [facility]. Bills [facility] and is scheduled for 8/14, but she was told by wound care staff she should really be seen at office since she is healing except for some necrotic tissue. Please call back and advise if she should be seen at office or to just keep appt with wound care for 8/14/24. Thanks

## 2024-08-06 LAB
DME PARACHUTE DELIVERY DATE ACTUAL: NORMAL
DME PARACHUTE DELIVERY DATE REQUESTED: NORMAL
DME PARACHUTE ITEM DESCRIPTION: NORMAL
DME PARACHUTE ORDER STATUS: NORMAL
DME PARACHUTE SUPPLIER NAME: NORMAL
DME PARACHUTE SUPPLIER PHONE: NORMAL

## 2024-08-14 ENCOUNTER — OFFICE VISIT (OUTPATIENT)
Dept: WOUND CARE | Facility: HOSPITAL | Age: 64
End: 2024-08-14
Payer: COMMERCIAL

## 2024-08-14 VITALS
SYSTOLIC BLOOD PRESSURE: 157 MMHG | WEIGHT: 147 LBS | DIASTOLIC BLOOD PRESSURE: 75 MMHG | TEMPERATURE: 97.1 F | BODY MASS INDEX: 25.1 KG/M2 | RESPIRATION RATE: 16 BRPM | HEIGHT: 64 IN | HEART RATE: 102 BPM

## 2024-08-14 DIAGNOSIS — S90.32XA HEMATOMA OF LEFT FOOT: Primary | ICD-10-CM

## 2024-08-14 DIAGNOSIS — L97.521 NON-PRESSURE CHRONIC ULCER OF OTHER PART OF LEFT FOOT LIMITED TO BREAKDOWN OF SKIN (HCC): ICD-10-CM

## 2024-08-14 PROCEDURE — 99213 OFFICE O/P EST LOW 20 MIN: CPT | Performed by: PODIATRIST

## 2024-08-14 NOTE — PROGRESS NOTES
Patient ID: Cheryl Godinez is a 64 y.o. female Date of Birth 1960     Diagnosis:  1. Post-traumatic hematoma of left foot  -     Wound cleansing and dressings Left;Lateral Foot; Future  -     Wound Procedure Treatment Left;Lateral Foot  2. 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 Post-traumatic hematoma of left foot  S90.32XA Wound cleansing and dressings Left;Lateral Foot     Wound Procedure Treatment Left;Lateral Foot      2. 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    Will have her continue Betadine and dry sterile dressing to the area.  She may wash the area with soap and water dry completely.        If the patient notices any systemic signs of infection including but not limited to fever, chills, nausea, vomiting or significant worsening of wound with increased drainage, redness or streaking up the foot or leg the patient should notify the office or present to the emergency room.      If wound debridement was completed today this was done in an attempt to promote healing, decrease risk of infection and for limb salvage efforts.     Goal of treatment: wound healing     Efforts to decrease pressure on the wound(s), evaluation and discussion of nutritional status, peripheral vascular status, and infection control have all been addressed with this patient.    Return in about 2 weeks (around 8/28/2024) for Next scheduled follow up, Wound Assessment, recheck.      Chief Complaint   Patient presents with   • New Patient Visit     Left foot wound           Subjective:   Patient returns for follow-up on left foot injury.  She had a drawer fall on her foot.  She is doing much better at this point her pain is improved.  I reviewed her x-rays  and CT scan from the hospital.  She has no acute fractures at this time.    She is using a surgical shoe.  She has no new acute changes.        The following portions of the patient's history were reviewed and updated as appropriate:   Patient Active Problem List   Diagnosis   • Leg injury   • Post-traumatic hematoma of left foot   • Hypertension   • Heart attack (HCC)     Past Medical History:   Diagnosis Date   • Arthritis    • COPD (chronic obstructive pulmonary disease) (HCC)    • Depression    • History of heart attack     27 years ago   • Hypertension      Past Surgical History:   Procedure Laterality Date   • CARDIAC PACEMAKER PLACEMENT     • FACIAL FRACTURE SURGERY     • FEMUR SURGERY     • KIDNEY SURGERY     • LIVER SURGERY       Social History     Socioeconomic History   • Marital status: Single     Spouse name: None   • Number of children: None   • Years of education: None   • Highest education level: None   Occupational History   • None   Tobacco Use   • Smoking status: Former     Types: Cigarettes     Passive exposure: Past   • Smokeless tobacco: Never   • Tobacco comments:     Smoked up yo 25 years ago   Vaping Use   • Vaping status: Never Used   Substance and Sexual Activity   • Alcohol use: Not Currently   • Drug use: None   • Sexual activity: None   Other Topics Concern   • None   Social History Narrative   • None     Social Determinants of Health     Financial Resource Strain: High Risk (6/4/2024)    Received from [facility]    Overall Financial Resource Strain (CARDIA)    • Difficulty of Paying Living Expenses: Hard   Food Insecurity: Food Insecurity Present (7/25/2024)    Hunger Vital Sign    • Worried About Running Out of Food in the Last Year: Sometimes true    • Ran Out of Food in the Last Year: Sometimes true   Transportation Needs: No Transportation Needs (7/25/2024)    PRAPARE - Transportation    • Lack of Transportation (Medical): No    • Lack of Transportation  "(Non-Medical): No   Physical Activity: Not on file   Stress: Stress Concern Present (6/4/2024)    Received from [facility]    Citizen of [place] [employer] [employer] - Occupational Stress Questionnaire    • Feeling of Stress : Very much   Social Connections: Feeling Socially Isolated (6/4/2024)    Received from [facility]    OASIS : Social Isolation    • How often do you feel lonely or isolated from those around you?: Always   Intimate Partner Violence: Not At Risk (6/4/2024)    Received from [facility]    Humiliation, Afraid, Rape, and Kick questionnaire    • Fear of Current or Ex-Partner: No    • Emotionally Abused: No    • Physically Abused: No    • Sexually Abused: No   Housing Stability: Low Risk  (7/25/2024)    Housing Stability Vital Sign    • Unable to Pay for Housing in the Last Year: No    • Number of Times Moved in the Last Year: 1    • Homeless in the Last Year: No   Recent Concern: Housing Stability - High Risk (6/4/2024)    Received from [facility]    Housing Stability Vital Sign    • Unable to Pay for Housing in the Last Year: No    • Number of Times Moved in the Last Year: 4    • Homeless in the Last Year: No      No current outpatient medications on file.  Family History   Problem Relation Age of Onset   • No Known Problems Mother    • Kidney failure Father    • Alcohol abuse Father       Review of Systems  Allergies:  Methotrexate, Sacubitril-valsartan, and Iodine - food allergy      Objective:  /75   Pulse 102   Temp (!) 97.1 °F (36.2 °C)   Resp 16   Ht 5' 4\" (1.626 m)   Wt 66.7 kg (147 lb)   BMI 25.23 kg/m²     Physical Exam      Wound 08/14/24 Other (Comment) Foot Left;Lateral (Active)   Wound Image Images linked 08/14/24 1341   Wound Description Black;Eschar;Pink 08/14/24 1342   Addie-wound Assessment Intact 08/14/24 1342   Wound Length (cm) 2.5 cm 08/14/24 1342   Wound Width (cm) 3 cm 08/14/24 1342   Wound " "Depth (cm) 0.1 cm 08/14/24 1342   Wound Surface Area (cm^2) 7.5 cm^2 08/14/24 1342   Wound Volume (cm^3) 0.75 cm^3 08/14/24 1342   Calculated Wound Volume (cm^3) 0.75 cm^3 08/14/24 1342   Drainage Amount None 08/14/24 1342   Non-staged Wound Description Full thickness 08/14/24 1342   Dressing Status Intact 08/14/24 1342                    Procedures             Wound Instructions:  Orders Placed This Encounter   Procedures   • Wound cleansing and dressings Left;Lateral Foot     Wound location left foot.   Change dressing daily.   You may remove the dressing and shower. Do not leave wound open to air, apply new dressing immediately.  Cleanse the wound with antibacterial soap and water or normal saline, pat dry.   Apply Batedine to the wound.  Cover with gauze.    Secure with roll gauze and tape.        ACE wrap to left foot:      Apply compression wrap to your affected Leg(s) from mid-foot to knee making sure to cover the heel. Apply in the morning and re-wrap as needed during the day if wrap becomes loose. Remove at bedtime and elevate legs or lie down.    Avoid prolonged standing in one place.    Elevate leg(s) above the level of the heart when sitting or as much as possible.     Standing Status:   Future     Standing Expiration Date:   8/21/2024   • Wound Procedure Treatment Left;Lateral Foot     This order was created via procedure documentation         Tremayne Bills DPM      Portions of the record may have been created with voice recognition software. Occasional wrong word or \"sound a like\" substitutions may have occurred due to the inherent limitations of voice recognition software. Read the chart carefully and recognize, using context, where substitutions have occurred.      "

## 2024-08-14 NOTE — PATIENT INSTRUCTIONS
Orders Placed This Encounter   Procedures    Wound cleansing and dressings Left;Lateral Foot     Wound location left foot.   Change dressing daily.   You may remove the dressing and shower. Do not leave wound open to air, apply new dressing immediately.  Cleanse the wound with antibacterial soap and water or normal saline, pat dry.   Apply Batedine to the wound.  Cover with gauze.    Secure with roll gauze and tape.        ACE wrap to left foot:      Apply compression wrap to your affected Leg(s) from mid-foot to knee making sure to cover the heel. Apply in the morning and re-wrap as needed during the day if wrap becomes loose. Remove at bedtime and elevate legs or lie down.    Avoid prolonged standing in one place.    Elevate leg(s) above the level of the heart when sitting or as much as possible.     Standing Status:   Future     Standing Expiration Date:   8/21/2024

## 2024-08-14 NOTE — PROGRESS NOTES
Wound Procedure Treatment Left;Lateral Foot    Performed by: Rosalee Gill RN  Authorized by: Tremayne Bills DPM    Associated wounds:   Wound 08/14/24 Other (Comment) Foot Left;Lateral  Wound cleansed with:  NSS  Applied Topical: Betadine    Applied secondary dressing:  Gauze  Dressing secured with:  Barry, Tape and Compression wrap  Offloading device appllied:  Surgical shoe

## 2024-09-04 ENCOUNTER — OFFICE VISIT (OUTPATIENT)
Dept: WOUND CARE | Facility: HOSPITAL | Age: 64
End: 2024-09-04
Payer: COMMERCIAL

## 2024-09-04 VITALS
DIASTOLIC BLOOD PRESSURE: 90 MMHG | RESPIRATION RATE: 16 BRPM | SYSTOLIC BLOOD PRESSURE: 169 MMHG | TEMPERATURE: 96.3 F | HEART RATE: 127 BPM

## 2024-09-04 DIAGNOSIS — S90.32XA HEMATOMA OF LEFT FOOT: Primary | ICD-10-CM

## 2024-09-04 DIAGNOSIS — L97.521 NON-PRESSURE CHRONIC ULCER OF OTHER PART OF LEFT FOOT LIMITED TO BREAKDOWN OF SKIN (HCC): ICD-10-CM

## 2024-09-04 PROCEDURE — 97597 DBRDMT OPN WND 1ST 20 CM/<: CPT | Performed by: PODIATRIST

## 2024-09-04 NOTE — PROGRESS NOTES
Wound Procedure Treatment Left;Lateral Foot    Performed by: Rosalee Gill RN  Authorized by: Tremayne Bills DPM    Associated wounds:   Wound 08/14/24 Other (Comment) Foot Left;Lateral  Wound cleansed with:  NSS  Applied Topical: Mupirocin ointment    Applied secondary dressing:  Gauze  Dressing secured with:  Barry, Tape and Compression wrap  Offloading device appllied:  Surgical shoe    ACE wrap.

## 2024-09-04 NOTE — PATIENT INSTRUCTIONS
Orders Placed This Encounter   Procedures    Wound cleansing and dressings Left;Lateral Foot     Wound location Left dorsal foot.   Change dressing daily.    You may remove the dressing and shower. Do not leave wound open to air, apply new dressing immediately.  Cleanse the wound with normal saline or mild soap and water, rinse, pat dry.  Apply triple antibiotic ointment to to open red/pink tissue in wound bed, not on the black eschar.  Cover with gauze.   Secure with roll gauze and tape.        ACE wrap to left foot:       Apply compression wrap to your affected Leg(s) from mid-foot to knee making sure to cover the heel. Apply in the morning and re-wrap as needed during the day if wrap becomes loose. Remove at bedtime and elevate legs or lie down.     Avoid prolonged standing in one place.     Elevate leg(s) above the level of the heart when sitting or as much as possible.     Standing Status:   Future     Standing Expiration Date:   9/11/2024

## 2024-09-04 NOTE — PROGRESS NOTES
Patient ID: Cheryl Godinez is a 64 y.o. female Date of Birth 1960     Diagnosis:  1. Hematoma of left foot  -     Wound cleansing and dressings Left;Lateral Foot; Future  -     Wound Procedure Treatment Left;Lateral Foot     Diagnosis ICD-10-CM Associated Orders   1. Hematoma of left foot  S90.32XA Wound cleansing and dressings Left;Lateral Foot     Wound Procedure Treatment Left;Lateral Foot           Assessment & Plan:  Patient is doing well at this point.  I did perform selective debridement today to the area as she does have the eschar that starting to come off.    Will continue localized treatment we will switch her over to an antibiotic ointment dry dressing to the area.        Notify office of any new acute changes that occur.    If the patient notices any systemic signs of infection including but not limited to fever, chills, nausea, vomiting or significant worsening of wound with increased drainage, redness or streaking up the foot or leg the patient should notify the office or present to the emergency room.      If wound debridement was completed today this was done in an attempt to promote healing, decrease risk of infection and for limb salvage efforts.     Goal of treatment: wound healing     Efforts to decrease pressure on the wound(s), evaluation and discussion of nutritional status, peripheral vascular status, and infection control have all been addressed with this patient.    Return in about 3 weeks (around 9/25/2024) for Next scheduled follow up, Wound Assessment, recheck.      Chief Complaint   Patient presents with    Follow Up Wound Care Visit     Right foot           Subjective:   Patient is doing well at this point her wound has been improving.  She has been using Betadine dry sterile dressing to the area continues with compression.  Pain is improved.  No other new acute issues.    The following portions of the patient's history were reviewed and updated as appropriate:   Patient  Active Problem List   Diagnosis    Leg injury    Post-traumatic hematoma of left foot    Hypertension    Heart attack (HCC)     Past Medical History:   Diagnosis Date    Arthritis     COPD (chronic obstructive pulmonary disease) (HCC)     Depression     History of heart attack     27 years ago    Hypertension      Past Surgical History:   Procedure Laterality Date    CARDIAC PACEMAKER PLACEMENT      FACIAL FRACTURE SURGERY      FEMUR SURGERY      KIDNEY SURGERY      LIVER SURGERY       Social History     Socioeconomic History    Marital status: Single     Spouse name: None    Number of children: None    Years of education: None    Highest education level: None   Occupational History    None   Tobacco Use    Smoking status: Former     Types: Cigarettes     Passive exposure: Past    Smokeless tobacco: Never    Tobacco comments:     Smoked up yo 25 years ago   Vaping Use    Vaping status: Never Used   Substance and Sexual Activity    Alcohol use: Not Currently    Drug use: None    Sexual activity: None   Other Topics Concern    None   Social History Narrative    None     Social Determinants of Health     Financial Resource Strain: High Risk (6/4/2024)    Received from [facility]    Overall Financial Resource Strain (CARDIA)     Difficulty of Paying Living Expenses: Hard   Food Insecurity: Food Insecurity Present (7/25/2024)    Hunger Vital Sign     Worried About Running Out of Food in the Last Year: Sometimes true     Ran Out of Food in the Last Year: Sometimes true   Transportation Needs: No Transportation Needs (7/25/2024)    PRAPARE - Transportation     Lack of Transportation (Medical): No     Lack of Transportation (Non-Medical): No   Physical Activity: Not on file   Stress: Stress Concern Present (6/4/2024)    Received from [facility]    [place] [employer] - Occupational Stress Questionnaire     Feeling of Stress : Very much   Social Connections: Feeling  Socially Isolated (6/4/2024)    Received from [facility]    OASIS : Social Isolation     How often do you feel lonely or isolated from those around you?: Always   Intimate Partner Violence: Not At Risk (6/4/2024)    Received from [facility]    Humiliation, Afraid, Rape, and Kick questionnaire     Fear of Current or Ex-Partner: No     Emotionally Abused: No     Physically Abused: No     Sexually Abused: No   Housing Stability: Low Risk  (7/25/2024)    Housing Stability Vital Sign     Unable to Pay for Housing in the Last Year: No     Number of Times Moved in the Last Year: 1     Homeless in the Last Year: No   Recent Concern: Housing Stability - High Risk (6/4/2024)    Received from [facility]    Housing Stability Vital Sign     Unable to Pay for Housing in the Last Year: No     Number of Times Moved in the Last Year: 4     Homeless in the Last Year: No      No current outpatient medications on file.  Family History   Problem Relation Age of Onset    No Known Problems Mother     Kidney failure Father     Alcohol abuse Father       Review of Systems  Allergies:  Methotrexate, Sacubitril-valsartan, and Iodine - food allergy      Objective:  /90   Pulse (!) 127   Temp (!) 96.3 °F (35.7 °C)   Resp 16     Physical Exam      Wound 08/14/24 Other (Comment) Foot Left;Lateral (Active)   Wound Image Images linked 09/04/24 1404   Wound Description Black;Eschar;Pink 09/04/24 1405   Addie-wound Assessment Intact 09/04/24 1405   Wound Length (cm) 1.3 cm 09/04/24 1405   Wound Width (cm) 1.8 cm 09/04/24 1405   Wound Depth (cm) 0.1 cm 09/04/24 1405   Wound Surface Area (cm^2) 2.34 cm^2 09/04/24 1405   Wound Volume (cm^3) 0.234 cm^3 09/04/24 1405   Calculated Wound Volume (cm^3) 0.23 cm^3 09/04/24 1405   Change in Wound Size % 69.33 09/04/24 1405   Drainage Amount Scant 09/04/24 1405   Drainage Description Serosanguineous 09/04/24 1405   Non-staged Wound Description  "Full thickness 09/04/24 1405   Dressing Status Intact 09/04/24 1405                  Debridement   Wound 08/14/24 Other (Comment) Foot Left;Lateral    Universal Protocol:  procedure performed by consultantConsent: Verbal consent obtained.  Risks and benefits: risks, benefits and alternatives were discussed  Consent given by: patient  Time out: Immediately prior to procedure a \"time out\" was called to verify the correct patient, procedure, equipment, support staff and site/side marked as required.  Patient understanding: patient states understanding of the procedure being performed  Patient identity confirmed: verbally with patient    Debridement Details  Performed by: physician  Debridement type: selective  Pain control: lidocaine 4%      Post-debridement measurements  Length (cm): 1.3  Width (cm): 1.8  Depth (cm): 0.1  Percent debrided: 5%  Surface Area (cm^2): 2.34  Area Debrided (cm^2): 0.12  Volume (cm^3): 0.23    Devitalized tissue debrided: biofilm, callus and fibrin  Instrument(s) utilized: blade  Bleeding: small  Hemostasis obtained with: pressure  Procedural pain (0-10): insensate  Post-procedural pain: insensate   Response to treatment: procedure was tolerated well                 Wound Instructions:  Orders Placed This Encounter   Procedures    Wound cleansing and dressings Left;Lateral Foot     Wound location Left dorsal foot.   Change dressing daily.    You may remove the dressing and shower. Do not leave wound open to air, apply new dressing immediately.  Cleanse the wound with normal saline or mild soap and water, rinse, pat dry.  Apply triple antibiotic ointment to to open red/pink tissue in wound bed, not on the black eschar.  Cover with gauze.   Secure with roll gauze and tape.        ACE wrap to left foot:       Apply compression wrap to your affected Leg(s) from mid-foot to knee making sure to cover the heel. Apply in the morning and re-wrap as needed during the day if wrap becomes loose. Remove " "at bedtime and elevate legs or lie down.     Avoid prolonged standing in one place.     Elevate leg(s) above the level of the heart when sitting or as much as possible.     Standing Status:   Future     Standing Expiration Date:   9/11/2024    Wound Procedure Treatment Left;Lateral Foot     This order was created via procedure documentation    Debridement     This order was created via procedure documentation         Tremayne Bills DPM      Portions of the record may have been created with voice recognition software. Occasional wrong word or \"sound a like\" substitutions may have occurred due to the inherent limitations of voice recognition software. Read the chart carefully and recognize, using context, where substitutions have occurred.      "